# Patient Record
Sex: MALE | Race: WHITE | Employment: OTHER | URBAN - METROPOLITAN AREA
[De-identification: names, ages, dates, MRNs, and addresses within clinical notes are randomized per-mention and may not be internally consistent; named-entity substitution may affect disease eponyms.]

---

## 2017-10-30 PROBLEM — L03.115 CELLULITIS OF RIGHT LOWER EXTREMITY: Status: ACTIVE | Noted: 2017-01-01

## 2018-01-01 ENCOUNTER — HOSPITAL ENCOUNTER (INPATIENT)
Age: 78
LOS: 1 days | Discharge: HOME | DRG: 282 | End: 2018-03-27
Attending: EMERGENCY MEDICINE | Admitting: INTERNAL MEDICINE
Payer: MEDICARE

## 2018-01-01 ENCOUNTER — APPOINTMENT (OUTPATIENT)
Dept: GENERAL RADIOLOGY | Age: 78
End: 2018-01-01
Payer: MEDICARE

## 2018-01-01 ENCOUNTER — HOSPITAL ENCOUNTER (EMERGENCY)
Age: 78
End: 2018-09-06
Attending: EMERGENCY MEDICINE
Payer: MEDICARE

## 2018-01-01 ENCOUNTER — APPOINTMENT (OUTPATIENT)
Dept: GENERAL RADIOLOGY | Age: 78
DRG: 065 | End: 2018-01-01
Payer: MEDICARE

## 2018-01-01 ENCOUNTER — APPOINTMENT (OUTPATIENT)
Dept: GENERAL RADIOLOGY | Age: 78
DRG: 683 | End: 2018-01-01
Payer: MEDICARE

## 2018-01-01 ENCOUNTER — APPOINTMENT (OUTPATIENT)
Dept: GENERAL RADIOLOGY | Age: 78
DRG: 293 | End: 2018-01-01
Payer: MEDICARE

## 2018-01-01 ENCOUNTER — HOSPITAL ENCOUNTER (EMERGENCY)
Age: 78
Discharge: HOME | End: 2018-04-15
Attending: EMERGENCY MEDICINE
Payer: MEDICARE

## 2018-01-01 ENCOUNTER — APPOINTMENT (OUTPATIENT)
Dept: ULTRASOUND IMAGING | Age: 78
DRG: 065 | End: 2018-01-01
Payer: MEDICARE

## 2018-01-01 ENCOUNTER — HOSPITAL ENCOUNTER (INPATIENT)
Age: 78
LOS: 2 days | Discharge: HOME | DRG: 293 | End: 2018-07-07
Attending: EMERGENCY MEDICINE | Admitting: INTERNAL MEDICINE
Payer: MEDICARE

## 2018-01-01 ENCOUNTER — APPOINTMENT (OUTPATIENT)
Dept: GENERAL RADIOLOGY | Age: 78
DRG: 282 | End: 2018-01-01
Payer: MEDICARE

## 2018-01-01 ENCOUNTER — HOSPITAL ENCOUNTER (INPATIENT)
Age: 78
LOS: 3 days | Discharge: HOME HEALTH | DRG: 065 | End: 2018-08-13
Attending: EMERGENCY MEDICINE | Admitting: INTERNAL MEDICINE
Payer: MEDICARE

## 2018-01-01 ENCOUNTER — HOSPITAL ENCOUNTER (INPATIENT)
Age: 78
LOS: 3 days | Discharge: HOME HEALTH | DRG: 683 | End: 2018-04-11
Attending: EMERGENCY MEDICINE | Admitting: INTERNAL MEDICINE
Payer: MEDICARE

## 2018-01-01 ENCOUNTER — APPOINTMENT (OUTPATIENT)
Dept: CT IMAGING | Age: 78
DRG: 065 | End: 2018-01-01
Payer: MEDICARE

## 2018-01-01 VITALS
RESPIRATION RATE: 18 BRPM | HEIGHT: 70 IN | OXYGEN SATURATION: 95 % | WEIGHT: 253.8 LBS | HEART RATE: 71 BPM | TEMPERATURE: 97.4 F | DIASTOLIC BLOOD PRESSURE: 52 MMHG | BODY MASS INDEX: 36.33 KG/M2 | SYSTOLIC BLOOD PRESSURE: 84 MMHG

## 2018-01-01 VITALS
BODY MASS INDEX: 34.44 KG/M2 | SYSTOLIC BLOOD PRESSURE: 100 MMHG | RESPIRATION RATE: 16 BRPM | DIASTOLIC BLOOD PRESSURE: 60 MMHG | WEIGHT: 240 LBS | TEMPERATURE: 98.7 F | HEART RATE: 70 BPM | OXYGEN SATURATION: 96 %

## 2018-01-01 VITALS
TEMPERATURE: 97.8 F | BODY MASS INDEX: 33.31 KG/M2 | HEART RATE: 71 BPM | DIASTOLIC BLOOD PRESSURE: 60 MMHG | RESPIRATION RATE: 16 BRPM | SYSTOLIC BLOOD PRESSURE: 93 MMHG | OXYGEN SATURATION: 93 % | WEIGHT: 224.9 LBS | HEIGHT: 69 IN

## 2018-01-01 VITALS
SYSTOLIC BLOOD PRESSURE: 102 MMHG | OXYGEN SATURATION: 99 % | RESPIRATION RATE: 18 BRPM | BODY MASS INDEX: 36.79 KG/M2 | HEIGHT: 70 IN | TEMPERATURE: 97.6 F | HEART RATE: 69 BPM | DIASTOLIC BLOOD PRESSURE: 62 MMHG | WEIGHT: 257 LBS

## 2018-01-01 VITALS
TEMPERATURE: 97.9 F | DIASTOLIC BLOOD PRESSURE: 53 MMHG | BODY MASS INDEX: 31.55 KG/M2 | HEIGHT: 69 IN | WEIGHT: 213 LBS | SYSTOLIC BLOOD PRESSURE: 115 MMHG | RESPIRATION RATE: 20 BRPM | HEART RATE: 71 BPM | OXYGEN SATURATION: 99 %

## 2018-01-01 VITALS — HEART RATE: 102 BPM

## 2018-01-01 DIAGNOSIS — R53.1 GENERALIZED WEAKNESS: ICD-10-CM

## 2018-01-01 DIAGNOSIS — I46.9 CARDIAC ARREST (HCC): ICD-10-CM

## 2018-01-01 DIAGNOSIS — J81.0 ACUTE PULMONARY EDEMA (HCC): ICD-10-CM

## 2018-01-01 DIAGNOSIS — R09.2 RESPIRATORY ARREST (HCC): Primary | ICD-10-CM

## 2018-01-01 DIAGNOSIS — I21.4 NSTEMI (NON-ST ELEVATED MYOCARDIAL INFARCTION) (HCC): Primary | ICD-10-CM

## 2018-01-01 DIAGNOSIS — S32.2XXA CLOSED FRACTURE OF COCCYX, INITIAL ENCOUNTER (HCC): ICD-10-CM

## 2018-01-01 DIAGNOSIS — R77.8 ELEVATED TROPONIN: ICD-10-CM

## 2018-01-01 DIAGNOSIS — I50.9 ACUTE ON CHRONIC CONGESTIVE HEART FAILURE, UNSPECIFIED CONGESTIVE HEART FAILURE TYPE: Primary | ICD-10-CM

## 2018-01-01 DIAGNOSIS — I50.9 ACUTE ON CHRONIC CONGESTIVE HEART FAILURE, UNSPECIFIED CONGESTIVE HEART FAILURE TYPE: ICD-10-CM

## 2018-01-01 DIAGNOSIS — J90 PLEURAL EFFUSION: ICD-10-CM

## 2018-01-01 DIAGNOSIS — N18.9 CHRONIC KIDNEY DISEASE, UNSPECIFIED CKD STAGE: ICD-10-CM

## 2018-01-01 DIAGNOSIS — R53.83 FATIGUE, UNSPECIFIED TYPE: ICD-10-CM

## 2018-01-01 DIAGNOSIS — R07.9 CHEST PAIN IN ADULT: Primary | ICD-10-CM

## 2018-01-01 DIAGNOSIS — I50.42 CHRONIC COMBINED SYSTOLIC AND DIASTOLIC CONGESTIVE HEART FAILURE (HCC): ICD-10-CM

## 2018-01-01 DIAGNOSIS — B35.6 TINEA CRURIS: Primary | ICD-10-CM

## 2018-01-01 DIAGNOSIS — N17.9 AKI (ACUTE KIDNEY INJURY) (HCC): ICD-10-CM

## 2018-01-01 DIAGNOSIS — B37.2 CUTANEOUS CANDIDIASIS: ICD-10-CM

## 2018-01-01 DIAGNOSIS — I50.9 ACUTE ON CHRONIC CONGESTIVE HEART FAILURE, UNSPECIFIED HEART FAILURE TYPE (HCC): Primary | ICD-10-CM

## 2018-01-01 LAB
ALBUMIN SERPL-MCNC: 3.1 G/DL (ref 3.5–5.2)
ALBUMIN SERPL-MCNC: 3.4 G/DL (ref 3.5–5.2)
ALBUMIN SERPL-MCNC: 3.5 G/DL (ref 3.5–5.2)
ALBUMIN SERPL-MCNC: 3.5 G/DL (ref 3.5–5.2)
ALBUMIN SERPL-MCNC: 3.6 G/DL (ref 3.5–5.2)
ALBUMIN SERPL-MCNC: 3.6 G/DL (ref 3.5–5.2)
ALBUMIN SERPL-MCNC: 3.8 G/DL (ref 3.5–5.2)
ALBUMIN SERPL-MCNC: 3.9 G/DL (ref 3.5–5.2)
ALP BLD-CCNC: 109 U/L (ref 40–129)
ALP BLD-CCNC: 112 U/L (ref 40–129)
ALP BLD-CCNC: 55 U/L (ref 40–129)
ALP BLD-CCNC: 55 U/L (ref 40–129)
ALP BLD-CCNC: 58 U/L (ref 40–129)
ALP BLD-CCNC: 63 U/L (ref 40–129)
ALP BLD-CCNC: 64 U/L (ref 40–129)
ALP BLD-CCNC: 68 U/L (ref 40–129)
ALP BLD-CCNC: 72 U/L (ref 40–129)
ALP BLD-CCNC: 91 U/L (ref 40–129)
ALT SERPL-CCNC: 30 U/L (ref 0–40)
ALT SERPL-CCNC: 34 U/L (ref 0–40)
ALT SERPL-CCNC: 39 U/L (ref 0–40)
ALT SERPL-CCNC: 40 U/L (ref 0–40)
ALT SERPL-CCNC: 41 U/L (ref 0–40)
ALT SERPL-CCNC: 44 U/L (ref 0–40)
ALT SERPL-CCNC: 47 U/L (ref 0–40)
ALT SERPL-CCNC: 54 U/L (ref 0–40)
ALT SERPL-CCNC: 62 U/L (ref 0–40)
ALT SERPL-CCNC: 63 U/L (ref 0–40)
ANION GAP SERPL CALCULATED.3IONS-SCNC: 10 MMOL/L (ref 7–16)
ANION GAP SERPL CALCULATED.3IONS-SCNC: 10 MMOL/L (ref 7–16)
ANION GAP SERPL CALCULATED.3IONS-SCNC: 11 MMOL/L (ref 7–16)
ANION GAP SERPL CALCULATED.3IONS-SCNC: 11 MMOL/L (ref 7–16)
ANION GAP SERPL CALCULATED.3IONS-SCNC: 12 MMOL/L (ref 7–16)
ANION GAP SERPL CALCULATED.3IONS-SCNC: 12 MMOL/L (ref 7–16)
ANION GAP SERPL CALCULATED.3IONS-SCNC: 13 MMOL/L (ref 7–16)
ANION GAP SERPL CALCULATED.3IONS-SCNC: 13 MMOL/L (ref 7–16)
ANION GAP SERPL CALCULATED.3IONS-SCNC: 14 MMOL/L (ref 7–16)
ANION GAP SERPL CALCULATED.3IONS-SCNC: 15 MMOL/L (ref 7–16)
ANION GAP SERPL CALCULATED.3IONS-SCNC: 8 MMOL/L (ref 7–16)
ANION GAP SERPL CALCULATED.3IONS-SCNC: 8 MMOL/L (ref 7–16)
APTT: 34.6 SEC (ref 24.5–35.1)
APTT: 41.7 SEC (ref 24.5–35.1)
AST SERPL-CCNC: 19 U/L (ref 0–39)
AST SERPL-CCNC: 26 U/L (ref 0–39)
AST SERPL-CCNC: 27 U/L (ref 0–39)
AST SERPL-CCNC: 28 U/L (ref 0–39)
AST SERPL-CCNC: 31 U/L (ref 0–39)
AST SERPL-CCNC: 33 U/L (ref 0–39)
AST SERPL-CCNC: 34 U/L (ref 0–39)
AST SERPL-CCNC: 40 U/L (ref 0–39)
AST SERPL-CCNC: 45 U/L (ref 0–39)
AST SERPL-CCNC: 56 U/L (ref 0–39)
BACTERIA: NORMAL /HPF
BASOPHILS ABSOLUTE: 0.05 E9/L (ref 0–0.2)
BASOPHILS ABSOLUTE: 0.05 E9/L (ref 0–0.2)
BASOPHILS ABSOLUTE: 0.06 E9/L (ref 0–0.2)
BASOPHILS ABSOLUTE: 0.07 E9/L (ref 0–0.2)
BASOPHILS ABSOLUTE: 0.07 E9/L (ref 0–0.2)
BASOPHILS RELATIVE PERCENT: 0.4 % (ref 0–2)
BASOPHILS RELATIVE PERCENT: 0.5 % (ref 0–2)
BASOPHILS RELATIVE PERCENT: 0.6 % (ref 0–2)
BASOPHILS RELATIVE PERCENT: 0.7 % (ref 0–2)
BILIRUB SERPL-MCNC: 0.4 MG/DL (ref 0–1.2)
BILIRUB SERPL-MCNC: 0.5 MG/DL (ref 0–1.2)
BILIRUB SERPL-MCNC: 0.7 MG/DL (ref 0–1.2)
BILIRUB SERPL-MCNC: 0.9 MG/DL (ref 0–1.2)
BILIRUB SERPL-MCNC: 1.1 MG/DL (ref 0–1.2)
BILIRUB SERPL-MCNC: 1.2 MG/DL (ref 0–1.2)
BILIRUB SERPL-MCNC: 1.3 MG/DL (ref 0–1.2)
BILIRUBIN URINE: NEGATIVE
BLOOD, URINE: ABNORMAL
BLOOD, URINE: NEGATIVE
BLOOD, URINE: NEGATIVE
BUN BLDV-MCNC: 30 MG/DL (ref 8–23)
BUN BLDV-MCNC: 33 MG/DL (ref 8–23)
BUN BLDV-MCNC: 34 MG/DL (ref 8–23)
BUN BLDV-MCNC: 34 MG/DL (ref 8–23)
BUN BLDV-MCNC: 35 MG/DL (ref 8–23)
BUN BLDV-MCNC: 40 MG/DL (ref 8–23)
BUN BLDV-MCNC: 41 MG/DL (ref 8–23)
BUN BLDV-MCNC: 41 MG/DL (ref 8–23)
BUN BLDV-MCNC: 44 MG/DL (ref 8–23)
BUN BLDV-MCNC: 49 MG/DL (ref 8–23)
BUN BLDV-MCNC: 49 MG/DL (ref 8–23)
BUN BLDV-MCNC: 55 MG/DL (ref 8–23)
CALCIUM SERPL-MCNC: 9 MG/DL (ref 8.6–10.2)
CALCIUM SERPL-MCNC: 9 MG/DL (ref 8.6–10.2)
CALCIUM SERPL-MCNC: 9.2 MG/DL (ref 8.6–10.2)
CALCIUM SERPL-MCNC: 9.2 MG/DL (ref 8.6–10.2)
CALCIUM SERPL-MCNC: 9.3 MG/DL (ref 8.6–10.2)
CALCIUM SERPL-MCNC: 9.3 MG/DL (ref 8.6–10.2)
CALCIUM SERPL-MCNC: 9.4 MG/DL (ref 8.6–10.2)
CALCIUM SERPL-MCNC: 9.5 MG/DL (ref 8.6–10.2)
CALCIUM SERPL-MCNC: 9.6 MG/DL (ref 8.6–10.2)
CALCIUM SERPL-MCNC: 9.9 MG/DL (ref 8.6–10.2)
CASTS: NORMAL /LPF
CHLORIDE BLD-SCNC: 100 MMOL/L (ref 98–107)
CHLORIDE BLD-SCNC: 101 MMOL/L (ref 98–107)
CHLORIDE BLD-SCNC: 101 MMOL/L (ref 98–107)
CHLORIDE BLD-SCNC: 102 MMOL/L (ref 98–107)
CHLORIDE BLD-SCNC: 102 MMOL/L (ref 98–107)
CHLORIDE BLD-SCNC: 103 MMOL/L (ref 98–107)
CHLORIDE BLD-SCNC: 104 MMOL/L (ref 98–107)
CHLORIDE BLD-SCNC: 107 MMOL/L (ref 98–107)
CHLORIDE BLD-SCNC: 98 MMOL/L (ref 98–107)
CHLORIDE BLD-SCNC: 99 MMOL/L (ref 98–107)
CHOLESTEROL, TOTAL: 103 MG/DL (ref 0–199)
CHOLESTEROL, TOTAL: 79 MG/DL (ref 0–199)
CK MB: 3.5 NG/ML (ref 0–7.7)
CK MB: 3.8 NG/ML (ref 0–7.7)
CLARITY: CLEAR
CO2: 24 MMOL/L (ref 22–29)
CO2: 25 MMOL/L (ref 22–29)
CO2: 25 MMOL/L (ref 22–29)
CO2: 26 MMOL/L (ref 22–29)
CO2: 27 MMOL/L (ref 22–29)
CO2: 28 MMOL/L (ref 22–29)
CO2: 29 MMOL/L (ref 22–29)
CO2: 30 MMOL/L (ref 22–29)
CO2: 31 MMOL/L (ref 22–29)
COLOR: YELLOW
CREAT SERPL-MCNC: 1.2 MG/DL (ref 0.7–1.2)
CREAT SERPL-MCNC: 1.4 MG/DL (ref 0.7–1.2)
CREAT SERPL-MCNC: 1.5 MG/DL (ref 0.7–1.2)
CREAT SERPL-MCNC: 1.7 MG/DL (ref 0.7–1.2)
CREAT SERPL-MCNC: 1.8 MG/DL (ref 0.7–1.2)
CREAT SERPL-MCNC: 1.8 MG/DL (ref 0.7–1.2)
EKG ATRIAL RATE: 202 BPM
EKG ATRIAL RATE: 267 BPM
EKG ATRIAL RATE: 67 BPM
EKG ATRIAL RATE: 70 BPM
EKG ATRIAL RATE: 72 BPM
EKG ATRIAL RATE: 82 BPM
EKG P-R INTERVAL: 126 MS
EKG Q-T INTERVAL: 292 MS
EKG Q-T INTERVAL: 474 MS
EKG Q-T INTERVAL: 486 MS
EKG Q-T INTERVAL: 492 MS
EKG Q-T INTERVAL: 498 MS
EKG Q-T INTERVAL: 506 MS
EKG QRS DURATION: 166 MS
EKG QRS DURATION: 170 MS
EKG QRS DURATION: 172 MS
EKG QRS DURATION: 176 MS
EKG QRS DURATION: 178 MS
EKG QRS DURATION: 74 MS
EKG QTC CALCULATION (BAZETT): 440 MS
EKG QTC CALCULATION (BAZETT): 511 MS
EKG QTC CALCULATION (BAZETT): 524 MS
EKG QTC CALCULATION (BAZETT): 534 MS
EKG QTC CALCULATION (BAZETT): 541 MS
EKG QTC CALCULATION (BAZETT): 569 MS
EKG R AXIS: -140 DEGREES
EKG R AXIS: -145 DEGREES
EKG R AXIS: -152 DEGREES
EKG R AXIS: -74 DEGREES
EKG R AXIS: -77 DEGREES
EKG R AXIS: -93 DEGREES
EKG T AXIS: 102 DEGREES
EKG T AXIS: 26 DEGREES
EKG T AXIS: 87 DEGREES
EKG T AXIS: 90 DEGREES
EKG T AXIS: 94 DEGREES
EKG T AXIS: 99 DEGREES
EKG VENTRICULAR RATE: 137 BPM
EKG VENTRICULAR RATE: 70 BPM
EKG VENTRICULAR RATE: 70 BPM
EKG VENTRICULAR RATE: 71 BPM
EKG VENTRICULAR RATE: 71 BPM
EKG VENTRICULAR RATE: 76 BPM
EOSINOPHILS ABSOLUTE: 0.06 E9/L (ref 0.05–0.5)
EOSINOPHILS ABSOLUTE: 0.07 E9/L (ref 0.05–0.5)
EOSINOPHILS ABSOLUTE: 0.07 E9/L (ref 0.05–0.5)
EOSINOPHILS ABSOLUTE: 0.1 E9/L (ref 0.05–0.5)
EOSINOPHILS ABSOLUTE: 0.11 E9/L (ref 0.05–0.5)
EOSINOPHILS ABSOLUTE: 0.12 E9/L (ref 0.05–0.5)
EOSINOPHILS ABSOLUTE: 0.12 E9/L (ref 0.05–0.5)
EOSINOPHILS ABSOLUTE: 0.16 E9/L (ref 0.05–0.5)
EOSINOPHILS ABSOLUTE: 0.16 E9/L (ref 0.05–0.5)
EOSINOPHILS ABSOLUTE: 0.21 E9/L (ref 0.05–0.5)
EOSINOPHILS ABSOLUTE: 0.25 E9/L (ref 0.05–0.5)
EOSINOPHILS RELATIVE PERCENT: 0.6 % (ref 0–6)
EOSINOPHILS RELATIVE PERCENT: 0.9 % (ref 0–6)
EOSINOPHILS RELATIVE PERCENT: 1 % (ref 0–6)
EOSINOPHILS RELATIVE PERCENT: 1.1 % (ref 0–6)
EOSINOPHILS RELATIVE PERCENT: 1.1 % (ref 0–6)
EOSINOPHILS RELATIVE PERCENT: 1.2 % (ref 0–6)
EOSINOPHILS RELATIVE PERCENT: 1.4 % (ref 0–6)
EOSINOPHILS RELATIVE PERCENT: 2 % (ref 0–6)
EOSINOPHILS RELATIVE PERCENT: 2.2 % (ref 0–6)
GFR AFRICAN AMERICAN: 45
GFR AFRICAN AMERICAN: 45
GFR AFRICAN AMERICAN: 48
GFR AFRICAN AMERICAN: 55
GFR AFRICAN AMERICAN: 60
GFR AFRICAN AMERICAN: >60
GFR NON-AFRICAN AMERICAN: 37 ML/MIN/1.73
GFR NON-AFRICAN AMERICAN: 37 ML/MIN/1.73
GFR NON-AFRICAN AMERICAN: 39 ML/MIN/1.73
GFR NON-AFRICAN AMERICAN: 45 ML/MIN/1.73
GFR NON-AFRICAN AMERICAN: 49 ML/MIN/1.73
GFR NON-AFRICAN AMERICAN: 59 ML/MIN/1.73
GLUCOSE BLD-MCNC: 104 MG/DL (ref 74–109)
GLUCOSE BLD-MCNC: 116 MG/DL (ref 74–109)
GLUCOSE BLD-MCNC: 121 MG/DL (ref 74–109)
GLUCOSE BLD-MCNC: 128 MG/DL (ref 74–109)
GLUCOSE BLD-MCNC: 138 MG/DL (ref 74–109)
GLUCOSE BLD-MCNC: 147 MG/DL (ref 74–109)
GLUCOSE BLD-MCNC: 160 MG/DL (ref 74–109)
GLUCOSE BLD-MCNC: 178 MG/DL (ref 74–109)
GLUCOSE BLD-MCNC: 184 MG/DL (ref 74–109)
GLUCOSE BLD-MCNC: 199 MG/DL (ref 74–109)
GLUCOSE BLD-MCNC: 208 MG/DL (ref 74–109)
GLUCOSE BLD-MCNC: 96 MG/DL (ref 74–109)
GLUCOSE URINE: NEGATIVE MG/DL
HBA1C MFR BLD: 7.1 % (ref 4–5.6)
HBA1C MFR BLD: 7.8 % (ref 4.8–5.9)
HBA1C MFR BLD: 7.9 % (ref 4.8–5.9)
HCT VFR BLD CALC: 38.4 % (ref 37–54)
HCT VFR BLD CALC: 38.9 % (ref 37–54)
HCT VFR BLD CALC: 40 % (ref 37–54)
HCT VFR BLD CALC: 40 % (ref 37–54)
HCT VFR BLD CALC: 40.4 % (ref 37–54)
HCT VFR BLD CALC: 40.9 % (ref 37–54)
HCT VFR BLD CALC: 41 % (ref 37–54)
HCT VFR BLD CALC: 41 % (ref 37–54)
HCT VFR BLD CALC: 41.5 % (ref 37–54)
HCT VFR BLD CALC: 42.5 % (ref 37–54)
HCT VFR BLD CALC: 42.7 % (ref 37–54)
HCT VFR BLD CALC: 43 % (ref 37–54)
HDLC SERPL-MCNC: 20 MG/DL
HDLC SERPL-MCNC: 23 MG/DL
HEMOGLOBIN: 12.8 G/DL (ref 12.5–16.5)
HEMOGLOBIN: 12.9 G/DL (ref 12.5–16.5)
HEMOGLOBIN: 13 G/DL (ref 12.5–16.5)
HEMOGLOBIN: 13.1 G/DL (ref 12.5–16.5)
HEMOGLOBIN: 13.2 G/DL (ref 12.5–16.5)
HEMOGLOBIN: 13.3 G/DL (ref 12.5–16.5)
HEMOGLOBIN: 13.3 G/DL (ref 12.5–16.5)
HEMOGLOBIN: 13.5 G/DL (ref 12.5–16.5)
HEMOGLOBIN: 13.6 G/DL (ref 12.5–16.5)
HEMOGLOBIN: 13.7 G/DL (ref 12.5–16.5)
HEMOGLOBIN: 13.8 G/DL (ref 12.5–16.5)
HEMOGLOBIN: 13.8 G/DL (ref 12.5–16.5)
IMMATURE GRANULOCYTES #: 0.04 E9/L
IMMATURE GRANULOCYTES #: 0.04 E9/L
IMMATURE GRANULOCYTES #: 0.05 E9/L
IMMATURE GRANULOCYTES #: 0.06 E9/L
IMMATURE GRANULOCYTES #: 0.07 E9/L
IMMATURE GRANULOCYTES #: 0.07 E9/L
IMMATURE GRANULOCYTES %: 0.3 % (ref 0–5)
IMMATURE GRANULOCYTES %: 0.4 % (ref 0–5)
IMMATURE GRANULOCYTES %: 0.4 % (ref 0–5)
IMMATURE GRANULOCYTES %: 0.5 % (ref 0–5)
IMMATURE GRANULOCYTES %: 0.6 % (ref 0–5)
IMMATURE GRANULOCYTES %: 0.6 % (ref 0–5)
INFLUENZA A BY PCR: NOT DETECTED
INFLUENZA B BY PCR: NOT DETECTED
INR BLD: 1.6
INR BLD: 2.1
INR BLD: 2.1
INR BLD: 2.3
INR BLD: 2.5
INR BLD: 3
INR BLD: 3.2
INR BLD: 3.3
INR BLD: 3.9
INR BLD: 5
INR BLD: 5.8
KETONES, URINE: NEGATIVE MG/DL
LACTIC ACID: 1.7 MMOL/L (ref 0.5–2.2)
LDL CHOLESTEROL CALCULATED: 40 MG/DL (ref 0–99)
LDL CHOLESTEROL CALCULATED: 63 MG/DL (ref 0–99)
LEUKOCYTE ESTERASE, URINE: NEGATIVE
LV EF: 18 %
LV EF: 20 %
LVEF MODALITY: NORMAL
LVEF MODALITY: NORMAL
LYMPHOCYTES ABSOLUTE: 0.72 E9/L (ref 1.5–4)
LYMPHOCYTES ABSOLUTE: 0.75 E9/L (ref 1.5–4)
LYMPHOCYTES ABSOLUTE: 0.77 E9/L (ref 1.5–4)
LYMPHOCYTES ABSOLUTE: 0.82 E9/L (ref 1.5–4)
LYMPHOCYTES ABSOLUTE: 0.93 E9/L (ref 1.5–4)
LYMPHOCYTES ABSOLUTE: 1 E9/L (ref 1.5–4)
LYMPHOCYTES ABSOLUTE: 1 E9/L (ref 1.5–4)
LYMPHOCYTES ABSOLUTE: 1.04 E9/L (ref 1.5–4)
LYMPHOCYTES ABSOLUTE: 1.08 E9/L (ref 1.5–4)
LYMPHOCYTES ABSOLUTE: 1.08 E9/L (ref 1.5–4)
LYMPHOCYTES ABSOLUTE: 1.13 E9/L (ref 1.5–4)
LYMPHOCYTES RELATIVE PERCENT: 10.8 % (ref 20–42)
LYMPHOCYTES RELATIVE PERCENT: 6.5 % (ref 20–42)
LYMPHOCYTES RELATIVE PERCENT: 6.6 % (ref 20–42)
LYMPHOCYTES RELATIVE PERCENT: 6.8 % (ref 20–42)
LYMPHOCYTES RELATIVE PERCENT: 7.2 % (ref 20–42)
LYMPHOCYTES RELATIVE PERCENT: 8.3 % (ref 20–42)
LYMPHOCYTES RELATIVE PERCENT: 8.6 % (ref 20–42)
LYMPHOCYTES RELATIVE PERCENT: 8.7 % (ref 20–42)
LYMPHOCYTES RELATIVE PERCENT: 8.7 % (ref 20–42)
LYMPHOCYTES RELATIVE PERCENT: 9.4 % (ref 20–42)
LYMPHOCYTES RELATIVE PERCENT: 9.9 % (ref 20–42)
MAGNESIUM: 2.1 MG/DL (ref 1.6–2.6)
MAGNESIUM: 2.6 MG/DL (ref 1.6–2.6)
MAGNESIUM: 2.7 MG/DL (ref 1.6–2.6)
MAGNESIUM: 2.7 MG/DL (ref 1.6–2.6)
MCH RBC QN AUTO: 31.5 PG (ref 26–35)
MCH RBC QN AUTO: 31.7 PG (ref 26–35)
MCH RBC QN AUTO: 32.3 PG (ref 26–35)
MCH RBC QN AUTO: 32.4 PG (ref 26–35)
MCH RBC QN AUTO: 32.5 PG (ref 26–35)
MCH RBC QN AUTO: 32.8 PG (ref 26–35)
MCH RBC QN AUTO: 32.9 PG (ref 26–35)
MCH RBC QN AUTO: 33 PG (ref 26–35)
MCH RBC QN AUTO: 33.1 PG (ref 26–35)
MCHC RBC AUTO-ENTMCNC: 32 % (ref 32–34.5)
MCHC RBC AUTO-ENTMCNC: 32.1 % (ref 32–34.5)
MCHC RBC AUTO-ENTMCNC: 32.2 % (ref 32–34.5)
MCHC RBC AUTO-ENTMCNC: 32.3 % (ref 32–34.5)
MCHC RBC AUTO-ENTMCNC: 32.4 % (ref 32–34.5)
MCHC RBC AUTO-ENTMCNC: 32.5 % (ref 32–34.5)
MCHC RBC AUTO-ENTMCNC: 32.7 % (ref 32–34.5)
MCHC RBC AUTO-ENTMCNC: 32.8 % (ref 32–34.5)
MCHC RBC AUTO-ENTMCNC: 32.9 % (ref 32–34.5)
MCHC RBC AUTO-ENTMCNC: 33.2 % (ref 32–34.5)
MCHC RBC AUTO-ENTMCNC: 33.3 % (ref 32–34.5)
MCHC RBC AUTO-ENTMCNC: 33.3 % (ref 32–34.5)
MCV RBC AUTO: 100.7 FL (ref 80–99.9)
MCV RBC AUTO: 101.2 FL (ref 80–99.9)
MCV RBC AUTO: 101.3 FL (ref 80–99.9)
MCV RBC AUTO: 101.4 FL (ref 80–99.9)
MCV RBC AUTO: 101.7 FL (ref 80–99.9)
MCV RBC AUTO: 102.2 FL (ref 80–99.9)
MCV RBC AUTO: 102.4 FL (ref 80–99.9)
MCV RBC AUTO: 95 FL (ref 80–99.9)
MCV RBC AUTO: 96.1 FL (ref 80–99.9)
MCV RBC AUTO: 97.8 FL (ref 80–99.9)
MCV RBC AUTO: 98.1 FL (ref 80–99.9)
MCV RBC AUTO: 99.7 FL (ref 80–99.9)
METER GLUCOSE: 104 MG/DL (ref 70–110)
METER GLUCOSE: 106 MG/DL (ref 70–110)
METER GLUCOSE: 108 MG/DL (ref 70–110)
METER GLUCOSE: 112 MG/DL (ref 70–110)
METER GLUCOSE: 112 MG/DL (ref 70–110)
METER GLUCOSE: 114 MG/DL (ref 70–110)
METER GLUCOSE: 115 MG/DL (ref 70–110)
METER GLUCOSE: 117 MG/DL (ref 70–110)
METER GLUCOSE: 118 MG/DL (ref 70–110)
METER GLUCOSE: 123 MG/DL (ref 70–110)
METER GLUCOSE: 125 MG/DL (ref 70–110)
METER GLUCOSE: 126 MG/DL (ref 70–110)
METER GLUCOSE: 134 MG/DL (ref 70–110)
METER GLUCOSE: 138 MG/DL (ref 70–110)
METER GLUCOSE: 144 MG/DL (ref 70–110)
METER GLUCOSE: 152 MG/DL (ref 70–110)
METER GLUCOSE: 153 MG/DL (ref 70–110)
METER GLUCOSE: 160 MG/DL (ref 70–110)
METER GLUCOSE: 161 MG/DL (ref 70–110)
METER GLUCOSE: 163 MG/DL (ref 70–110)
METER GLUCOSE: 169 MG/DL (ref 70–110)
METER GLUCOSE: 172 MG/DL (ref 70–110)
METER GLUCOSE: 179 MG/DL (ref 70–110)
METER GLUCOSE: 184 MG/DL (ref 70–110)
METER GLUCOSE: 189 MG/DL (ref 70–110)
METER GLUCOSE: 201 MG/DL (ref 70–110)
METER GLUCOSE: 210 MG/DL (ref 70–110)
METER GLUCOSE: 237 MG/DL (ref 70–110)
METER GLUCOSE: 240 MG/DL (ref 70–110)
METER GLUCOSE: 288 MG/DL (ref 70–110)
METER GLUCOSE: 61 MG/DL (ref 70–110)
METER GLUCOSE: 61 MG/DL (ref 70–110)
METER GLUCOSE: 79 MG/DL (ref 70–110)
METER GLUCOSE: 90 MG/DL (ref 70–110)
METER GLUCOSE: 92 MG/DL (ref 70–110)
METER GLUCOSE: 92 MG/DL (ref 70–110)
METER GLUCOSE: 93 MG/DL (ref 70–110)
METER GLUCOSE: 95 MG/DL (ref 70–110)
MONOCYTES ABSOLUTE: 0.81 E9/L (ref 0.1–0.95)
MONOCYTES ABSOLUTE: 0.9 E9/L (ref 0.1–0.95)
MONOCYTES ABSOLUTE: 0.9 E9/L (ref 0.1–0.95)
MONOCYTES ABSOLUTE: 0.91 E9/L (ref 0.1–0.95)
MONOCYTES ABSOLUTE: 0.98 E9/L (ref 0.1–0.95)
MONOCYTES ABSOLUTE: 1.05 E9/L (ref 0.1–0.95)
MONOCYTES ABSOLUTE: 1.08 E9/L (ref 0.1–0.95)
MONOCYTES ABSOLUTE: 1.09 E9/L (ref 0.1–0.95)
MONOCYTES ABSOLUTE: 1.11 E9/L (ref 0.1–0.95)
MONOCYTES ABSOLUTE: 1.13 E9/L (ref 0.1–0.95)
MONOCYTES ABSOLUTE: 1.37 E9/L (ref 0.1–0.95)
MONOCYTES RELATIVE PERCENT: 10.8 % (ref 2–12)
MONOCYTES RELATIVE PERCENT: 11.9 % (ref 2–12)
MONOCYTES RELATIVE PERCENT: 7.8 % (ref 2–12)
MONOCYTES RELATIVE PERCENT: 7.8 % (ref 2–12)
MONOCYTES RELATIVE PERCENT: 8.1 % (ref 2–12)
MONOCYTES RELATIVE PERCENT: 8.5 % (ref 2–12)
MONOCYTES RELATIVE PERCENT: 8.7 % (ref 2–12)
MONOCYTES RELATIVE PERCENT: 8.9 % (ref 2–12)
MONOCYTES RELATIVE PERCENT: 8.9 % (ref 2–12)
MONOCYTES RELATIVE PERCENT: 9.4 % (ref 2–12)
MONOCYTES RELATIVE PERCENT: 9.4 % (ref 2–12)
NEUTROPHILS ABSOLUTE: 10.01 E9/L (ref 1.8–7.3)
NEUTROPHILS ABSOLUTE: 10.59 E9/L (ref 1.8–7.3)
NEUTROPHILS ABSOLUTE: 7.91 E9/L (ref 1.8–7.3)
NEUTROPHILS ABSOLUTE: 8.32 E9/L (ref 1.8–7.3)
NEUTROPHILS ABSOLUTE: 8.61 E9/L (ref 1.8–7.3)
NEUTROPHILS ABSOLUTE: 8.72 E9/L (ref 1.8–7.3)
NEUTROPHILS ABSOLUTE: 8.75 E9/L (ref 1.8–7.3)
NEUTROPHILS ABSOLUTE: 9 E9/L (ref 1.8–7.3)
NEUTROPHILS ABSOLUTE: 9.27 E9/L (ref 1.8–7.3)
NEUTROPHILS ABSOLUTE: 9.37 E9/L (ref 1.8–7.3)
NEUTROPHILS ABSOLUTE: 9.62 E9/L (ref 1.8–7.3)
NEUTROPHILS RELATIVE PERCENT: 75.2 % (ref 43–80)
NEUTROPHILS RELATIVE PERCENT: 76.4 % (ref 43–80)
NEUTROPHILS RELATIVE PERCENT: 79.2 % (ref 43–80)
NEUTROPHILS RELATIVE PERCENT: 80.5 % (ref 43–80)
NEUTROPHILS RELATIVE PERCENT: 80.7 % (ref 43–80)
NEUTROPHILS RELATIVE PERCENT: 80.9 % (ref 43–80)
NEUTROPHILS RELATIVE PERCENT: 81.4 % (ref 43–80)
NEUTROPHILS RELATIVE PERCENT: 81.8 % (ref 43–80)
NEUTROPHILS RELATIVE PERCENT: 82.3 % (ref 43–80)
NEUTROPHILS RELATIVE PERCENT: 82.7 % (ref 43–80)
NEUTROPHILS RELATIVE PERCENT: 83.3 % (ref 43–80)
NITRITE, URINE: NEGATIVE
PDW BLD-RTO: 14.8 FL (ref 11.5–15)
PDW BLD-RTO: 14.9 FL (ref 11.5–15)
PDW BLD-RTO: 14.9 FL (ref 11.5–15)
PDW BLD-RTO: 15 FL (ref 11.5–15)
PDW BLD-RTO: 15 FL (ref 11.5–15)
PDW BLD-RTO: 15.1 FL (ref 11.5–15)
PDW BLD-RTO: 15.2 FL (ref 11.5–15)
PDW BLD-RTO: 15.9 FL (ref 11.5–15)
PDW BLD-RTO: 16.2 FL (ref 11.5–15)
PDW BLD-RTO: 16.4 FL (ref 11.5–15)
PH UA: 6 (ref 5–9)
PH UA: 6 (ref 5–9)
PH UA: 7 (ref 5–9)
PLATELET # BLD: 163 E9/L (ref 130–450)
PLATELET # BLD: 171 E9/L (ref 130–450)
PLATELET # BLD: 180 E9/L (ref 130–450)
PLATELET # BLD: 187 E9/L (ref 130–450)
PLATELET # BLD: 192 E9/L (ref 130–450)
PLATELET # BLD: 194 E9/L (ref 130–450)
PLATELET # BLD: 195 E9/L (ref 130–450)
PLATELET # BLD: 196 E9/L (ref 130–450)
PLATELET # BLD: 201 E9/L (ref 130–450)
PLATELET # BLD: 212 E9/L (ref 130–450)
PLATELET # BLD: 236 E9/L (ref 130–450)
PLATELET # BLD: 243 E9/L (ref 130–450)
PMV BLD AUTO: 12.2 FL (ref 7–12)
PMV BLD AUTO: 12.3 FL (ref 7–12)
PMV BLD AUTO: 12.4 FL (ref 7–12)
PMV BLD AUTO: 12.4 FL (ref 7–12)
PMV BLD AUTO: 12.6 FL (ref 7–12)
PMV BLD AUTO: 12.6 FL (ref 7–12)
PMV BLD AUTO: 12.7 FL (ref 7–12)
PMV BLD AUTO: 12.8 FL (ref 7–12)
PMV BLD AUTO: 12.8 FL (ref 7–12)
PMV BLD AUTO: 12.9 FL (ref 7–12)
PMV BLD AUTO: 12.9 FL (ref 7–12)
PMV BLD AUTO: 13.2 FL (ref 7–12)
POTASSIUM REFLEX MAGNESIUM: 4.4 MMOL/L (ref 3.5–5)
POTASSIUM REFLEX MAGNESIUM: 4.5 MMOL/L (ref 3.5–5)
POTASSIUM REFLEX MAGNESIUM: 4.6 MMOL/L (ref 3.5–5)
POTASSIUM REFLEX MAGNESIUM: 4.6 MMOL/L (ref 3.5–5)
POTASSIUM SERPL-SCNC: 3.7 MMOL/L (ref 3.5–5)
POTASSIUM SERPL-SCNC: 4 MMOL/L (ref 3.5–5)
POTASSIUM SERPL-SCNC: 4 MMOL/L (ref 3.5–5)
POTASSIUM SERPL-SCNC: 4.2 MMOL/L (ref 3.5–5)
POTASSIUM SERPL-SCNC: 4.6 MMOL/L (ref 3.5–5)
POTASSIUM SERPL-SCNC: 5 MMOL/L (ref 3.5–5)
PRO-BNP: 2364 PG/ML (ref 0–450)
PRO-BNP: 3285 PG/ML (ref 0–450)
PRO-BNP: 4105 PG/ML (ref 0–450)
PRO-BNP: 4778 PG/ML (ref 0–450)
PRO-BNP: 8532 PG/ML (ref 0–450)
PROTEIN UA: NEGATIVE MG/DL
PROTHROMBIN TIME: 18.4 SEC (ref 9.3–12.4)
PROTHROMBIN TIME: 23.4 SEC (ref 9.3–12.4)
PROTHROMBIN TIME: 23.5 SEC (ref 9.3–12.4)
PROTHROMBIN TIME: 26.7 SEC (ref 9.3–12.4)
PROTHROMBIN TIME: 28.2 SEC (ref 9.3–12.4)
PROTHROMBIN TIME: 33.6 SEC (ref 9.3–12.4)
PROTHROMBIN TIME: 35.8 SEC (ref 9.3–12.4)
PROTHROMBIN TIME: 36.2 SEC (ref 9.3–12.4)
PROTHROMBIN TIME: 36.6 SEC (ref 9.3–12.4)
PROTHROMBIN TIME: 37.1 SEC (ref 9.3–12.4)
PROTHROMBIN TIME: 43.1 SEC (ref 9.3–12.4)
PROTHROMBIN TIME: 58.2 SEC (ref 9.3–12.4)
PROTHROMBIN TIME: 67.5 SEC (ref 9.3–12.4)
RBC # BLD: 3.9 E12/L (ref 3.8–5.8)
RBC # BLD: 3.95 E12/L (ref 3.8–5.8)
RBC # BLD: 4.01 E12/L (ref 3.8–5.8)
RBC # BLD: 4.03 E12/L (ref 3.8–5.8)
RBC # BLD: 4.04 E12/L (ref 3.8–5.8)
RBC # BLD: 4.04 E12/L (ref 3.8–5.8)
RBC # BLD: 4.09 E12/L (ref 3.8–5.8)
RBC # BLD: 4.16 E12/L (ref 3.8–5.8)
RBC # BLD: 4.18 E12/L (ref 3.8–5.8)
RBC # BLD: 4.2 E12/L (ref 3.8–5.8)
RBC # BLD: 4.21 E12/L (ref 3.8–5.8)
RBC # BLD: 4.32 E12/L (ref 3.8–5.8)
RBC UA: NORMAL /HPF (ref 0–2)
SODIUM BLD-SCNC: 135 MMOL/L (ref 132–146)
SODIUM BLD-SCNC: 139 MMOL/L (ref 132–146)
SODIUM BLD-SCNC: 141 MMOL/L (ref 132–146)
SODIUM BLD-SCNC: 142 MMOL/L (ref 132–146)
SODIUM BLD-SCNC: 143 MMOL/L (ref 132–146)
SPECIFIC GRAVITY UA: 1.01 (ref 1–1.03)
TOTAL CK: 38 U/L (ref 20–200)
TOTAL CK: 54 U/L (ref 20–200)
TOTAL PROTEIN: 5.8 G/DL (ref 6.4–8.3)
TOTAL PROTEIN: 5.9 G/DL (ref 6.4–8.3)
TOTAL PROTEIN: 6.3 G/DL (ref 6.4–8.3)
TOTAL PROTEIN: 6.3 G/DL (ref 6.4–8.3)
TOTAL PROTEIN: 6.4 G/DL (ref 6.4–8.3)
TOTAL PROTEIN: 6.5 G/DL (ref 6.4–8.3)
TRIGL SERPL-MCNC: 87 MG/DL (ref 0–149)
TRIGL SERPL-MCNC: 94 MG/DL (ref 0–149)
TROPONIN: 0.01 NG/ML (ref 0–0.03)
TROPONIN: 0.02 NG/ML (ref 0–0.03)
TROPONIN: 0.03 NG/ML (ref 0–0.03)
TROPONIN: 0.03 NG/ML (ref 0–0.03)
TROPONIN: 0.08 NG/ML (ref 0–0.03)
TROPONIN: 0.1 NG/ML (ref 0–0.03)
TROPONIN: 0.1 NG/ML (ref 0–0.03)
TROPONIN: 0.15 NG/ML (ref 0–0.03)
TROPONIN: 0.15 NG/ML (ref 0–0.03)
TROPONIN: 0.16 NG/ML (ref 0–0.03)
UROBILINOGEN, URINE: 0.2 E.U./DL
VLDLC SERPL CALC-MCNC: 17 MG/DL
VLDLC SERPL CALC-MCNC: 19 MG/DL
WBC # BLD: 10.5 E9/L (ref 4.5–11.5)
WBC # BLD: 10.6 E9/L (ref 4.5–11.5)
WBC # BLD: 11 E9/L (ref 4.5–11.5)
WBC # BLD: 11.5 E9/L (ref 4.5–11.5)
WBC # BLD: 11.5 E9/L (ref 4.5–11.5)
WBC # BLD: 11.6 E9/L (ref 4.5–11.5)
WBC # BLD: 11.8 E9/L (ref 4.5–11.5)
WBC # BLD: 12.1 E9/L (ref 4.5–11.5)
WBC # BLD: 13 E9/L (ref 4.5–11.5)
WBC # BLD: 14.8 E9/L (ref 4.5–11.5)
WBC UA: NORMAL /HPF (ref 0–5)
WOUND/ABSCESS: NORMAL

## 2018-01-01 PROCEDURE — 2580000003 HC RX 258: Performed by: INTERNAL MEDICINE

## 2018-01-01 PROCEDURE — 87070 CULTURE OTHR SPECIMN AEROBIC: CPT

## 2018-01-01 PROCEDURE — 93306 TTE W/DOPPLER COMPLETE: CPT

## 2018-01-01 PROCEDURE — 85610 PROTHROMBIN TIME: CPT

## 2018-01-01 PROCEDURE — 36415 COLL VENOUS BLD VENIPUNCTURE: CPT

## 2018-01-01 PROCEDURE — 83735 ASSAY OF MAGNESIUM: CPT

## 2018-01-01 PROCEDURE — 2700000000 HC OXYGEN THERAPY PER DAY

## 2018-01-01 PROCEDURE — 6370000000 HC RX 637 (ALT 250 FOR IP): Performed by: INTERNAL MEDICINE

## 2018-01-01 PROCEDURE — 84484 ASSAY OF TROPONIN QUANT: CPT

## 2018-01-01 PROCEDURE — 6370000000 HC RX 637 (ALT 250 FOR IP): Performed by: PODIATRIST

## 2018-01-01 PROCEDURE — 82962 GLUCOSE BLOOD TEST: CPT

## 2018-01-01 PROCEDURE — 83036 HEMOGLOBIN GLYCOSYLATED A1C: CPT

## 2018-01-01 PROCEDURE — 99285 EMERGENCY DEPT VISIT HI MDM: CPT

## 2018-01-01 PROCEDURE — 83880 ASSAY OF NATRIURETIC PEPTIDE: CPT

## 2018-01-01 PROCEDURE — 80053 COMPREHEN METABOLIC PANEL: CPT

## 2018-01-01 PROCEDURE — 99283 EMERGENCY DEPT VISIT LOW MDM: CPT

## 2018-01-01 PROCEDURE — 97165 OT EVAL LOW COMPLEX 30 MIN: CPT

## 2018-01-01 PROCEDURE — 81003 URINALYSIS AUTO W/O SCOPE: CPT

## 2018-01-01 PROCEDURE — 93005 ELECTROCARDIOGRAM TRACING: CPT

## 2018-01-01 PROCEDURE — 6360000002 HC RX W HCPCS: Performed by: EMERGENCY MEDICINE

## 2018-01-01 PROCEDURE — 6360000002 HC RX W HCPCS: Performed by: INTERNAL MEDICINE

## 2018-01-01 PROCEDURE — 31500 INSERT EMERGENCY AIRWAY: CPT

## 2018-01-01 PROCEDURE — 2580000003 HC RX 258: Performed by: STUDENT IN AN ORGANIZED HEALTH CARE EDUCATION/TRAINING PROGRAM

## 2018-01-01 PROCEDURE — 87502 INFLUENZA DNA AMP PROBE: CPT

## 2018-01-01 PROCEDURE — 85025 COMPLETE CBC W/AUTO DIFF WBC: CPT

## 2018-01-01 PROCEDURE — 2060000000 HC ICU INTERMEDIATE R&B

## 2018-01-01 PROCEDURE — G8987 SELF CARE CURRENT STATUS: HCPCS

## 2018-01-01 PROCEDURE — 85027 COMPLETE CBC AUTOMATED: CPT

## 2018-01-01 PROCEDURE — 72220 X-RAY EXAM SACRUM TAILBONE: CPT

## 2018-01-01 PROCEDURE — 71045 X-RAY EXAM CHEST 1 VIEW: CPT

## 2018-01-01 PROCEDURE — 6360000002 HC RX W HCPCS: Performed by: STUDENT IN AN ORGANIZED HEALTH CARE EDUCATION/TRAINING PROGRAM

## 2018-01-01 PROCEDURE — 97161 PT EVAL LOW COMPLEX 20 MIN: CPT

## 2018-01-01 PROCEDURE — 93005 ELECTROCARDIOGRAM TRACING: CPT | Performed by: EMERGENCY MEDICINE

## 2018-01-01 PROCEDURE — 94002 VENT MGMT INPAT INIT DAY: CPT

## 2018-01-01 PROCEDURE — 36556 INSERT NON-TUNNEL CV CATH: CPT

## 2018-01-01 PROCEDURE — 82550 ASSAY OF CK (CPK): CPT

## 2018-01-01 PROCEDURE — 81001 URINALYSIS AUTO W/SCOPE: CPT

## 2018-01-01 PROCEDURE — 6370000000 HC RX 637 (ALT 250 FOR IP): Performed by: PHYSICIAN ASSISTANT

## 2018-01-01 PROCEDURE — 80061 LIPID PANEL: CPT

## 2018-01-01 PROCEDURE — 99221 1ST HOSP IP/OBS SF/LOW 40: CPT | Performed by: PSYCHIATRY & NEUROLOGY

## 2018-01-01 PROCEDURE — 99232 SBSQ HOSP IP/OBS MODERATE 35: CPT | Performed by: NURSE PRACTITIONER

## 2018-01-01 PROCEDURE — G8979 MOBILITY GOAL STATUS: HCPCS

## 2018-01-01 PROCEDURE — G8978 MOBILITY CURRENT STATUS: HCPCS

## 2018-01-01 PROCEDURE — 97535 SELF CARE MNGMENT TRAINING: CPT

## 2018-01-01 PROCEDURE — 82553 CREATINE MB FRACTION: CPT

## 2018-01-01 PROCEDURE — 80048 BASIC METABOLIC PNL TOTAL CA: CPT

## 2018-01-01 PROCEDURE — 92950 HEART/LUNG RESUSCITATION CPR: CPT

## 2018-01-01 PROCEDURE — 6370000000 HC RX 637 (ALT 250 FOR IP)

## 2018-01-01 PROCEDURE — 93880 EXTRACRANIAL BILAT STUDY: CPT

## 2018-01-01 PROCEDURE — 85730 THROMBOPLASTIN TIME PARTIAL: CPT

## 2018-01-01 PROCEDURE — 99222 1ST HOSP IP/OBS MODERATE 55: CPT | Performed by: NURSE PRACTITIONER

## 2018-01-01 PROCEDURE — G8988 SELF CARE GOAL STATUS: HCPCS

## 2018-01-01 PROCEDURE — 70450 CT HEAD/BRAIN W/O DYE: CPT

## 2018-01-01 PROCEDURE — 51702 INSERT TEMP BLADDER CATH: CPT

## 2018-01-01 PROCEDURE — 3430000000 HC RX DIAGNOSTIC RADIOPHARMACEUTICAL: Performed by: RADIOLOGY

## 2018-01-01 PROCEDURE — 99232 SBSQ HOSP IP/OBS MODERATE 35: CPT | Performed by: CLINICAL NURSE SPECIALIST

## 2018-01-01 PROCEDURE — 97530 THERAPEUTIC ACTIVITIES: CPT

## 2018-01-01 PROCEDURE — 2140000000 HC CCU INTERMEDIATE R&B

## 2018-01-01 PROCEDURE — A9500 TC99M SESTAMIBI: HCPCS | Performed by: RADIOLOGY

## 2018-01-01 PROCEDURE — 83605 ASSAY OF LACTIC ACID: CPT

## 2018-01-01 PROCEDURE — 6370000000 HC RX 637 (ALT 250 FOR IP): Performed by: EMERGENCY MEDICINE

## 2018-01-01 PROCEDURE — 93017 CV STRESS TEST TRACING ONLY: CPT

## 2018-01-01 PROCEDURE — 78452 HT MUSCLE IMAGE SPECT MULT: CPT

## 2018-01-01 PROCEDURE — 2580000003 HC RX 258: Performed by: EMERGENCY MEDICINE

## 2018-01-01 PROCEDURE — 93005 ELECTROCARDIOGRAM TRACING: CPT | Performed by: NURSE PRACTITIONER

## 2018-01-01 PROCEDURE — 1200000000 HC SEMI PRIVATE

## 2018-01-01 RX ORDER — TORSEMIDE 20 MG/1
40 TABLET ORAL DAILY
Qty: 30 TABLET | Refills: 3 | Status: SHIPPED | OUTPATIENT
Start: 2018-01-01 | End: 2018-01-01 | Stop reason: HOSPADM

## 2018-01-01 RX ORDER — SPIRONOLACTONE 25 MG/1
12.5 TABLET ORAL DAILY
Status: DISCONTINUED | OUTPATIENT
Start: 2018-01-01 | End: 2018-01-01 | Stop reason: HOSPADM

## 2018-01-01 RX ORDER — DEXTROSE MONOHYDRATE 25 G/50ML
12.5 INJECTION, SOLUTION INTRAVENOUS PRN
Status: DISCONTINUED | OUTPATIENT
Start: 2018-01-01 | End: 2018-01-01 | Stop reason: HOSPADM

## 2018-01-01 RX ORDER — CARVEDILOL 6.25 MG/1
6.25 TABLET ORAL 2 TIMES DAILY WITH MEALS
Qty: 60 TABLET | Refills: 0 | Status: SHIPPED | OUTPATIENT
Start: 2018-01-01 | End: 2018-01-01

## 2018-01-01 RX ORDER — ISOSORBIDE MONONITRATE 30 MG/1
30 TABLET, EXTENDED RELEASE ORAL DAILY
Status: DISCONTINUED | OUTPATIENT
Start: 2018-01-01 | End: 2018-01-01 | Stop reason: HOSPADM

## 2018-01-01 RX ORDER — VALSARTAN 80 MG/1
20 TABLET ORAL DAILY
Status: DISCONTINUED | OUTPATIENT
Start: 2018-01-01 | End: 2018-01-01 | Stop reason: HOSPADM

## 2018-01-01 RX ORDER — TORSEMIDE 20 MG/1
40 TABLET ORAL 2 TIMES DAILY
Status: ON HOLD | COMMUNITY
End: 2018-01-01

## 2018-01-01 RX ORDER — CARVEDILOL 6.25 MG/1
6.25 TABLET ORAL 2 TIMES DAILY WITH MEALS
Status: DISCONTINUED | OUTPATIENT
Start: 2018-01-01 | End: 2018-01-01 | Stop reason: HOSPADM

## 2018-01-01 RX ORDER — CLOPIDOGREL BISULFATE 75 MG/1
75 TABLET ORAL DAILY
Status: DISCONTINUED | OUTPATIENT
Start: 2018-01-01 | End: 2018-01-01 | Stop reason: HOSPADM

## 2018-01-01 RX ORDER — WARFARIN SODIUM 5 MG/1
5 TABLET ORAL
Status: COMPLETED | OUTPATIENT
Start: 2018-01-01 | End: 2018-01-01

## 2018-01-01 RX ORDER — ACETAMINOPHEN 325 MG/1
650 TABLET ORAL EVERY 4 HOURS PRN
Status: DISCONTINUED | OUTPATIENT
Start: 2018-01-01 | End: 2018-01-01 | Stop reason: HOSPADM

## 2018-01-01 RX ORDER — SODIUM CHLORIDE 0.9 % (FLUSH) 0.9 %
10 SYRINGE (ML) INJECTION PRN
Status: DISCONTINUED | OUTPATIENT
Start: 2018-01-01 | End: 2018-01-01 | Stop reason: HOSPADM

## 2018-01-01 RX ORDER — ASPIRIN 81 MG/1
81 TABLET, CHEWABLE ORAL NIGHTLY
Status: DISCONTINUED | OUTPATIENT
Start: 2018-01-01 | End: 2018-01-01 | Stop reason: HOSPADM

## 2018-01-01 RX ORDER — FUROSEMIDE 10 MG/ML
40 INJECTION INTRAMUSCULAR; INTRAVENOUS DAILY
Status: DISCONTINUED | OUTPATIENT
Start: 2018-01-01 | End: 2018-01-01 | Stop reason: HOSPADM

## 2018-01-01 RX ORDER — WARFARIN SODIUM 6 MG/1
6 TABLET ORAL
Status: DISCONTINUED | OUTPATIENT
Start: 2018-01-01 | End: 2018-01-01 | Stop reason: HOSPADM

## 2018-01-01 RX ORDER — AMIODARONE HYDROCHLORIDE 100 MG/1
100 TABLET ORAL DAILY
COMMUNITY

## 2018-01-01 RX ORDER — CLOTRIMAZOLE AND BETAMETHASONE DIPROPIONATE 10; .64 MG/G; MG/G
CREAM TOPICAL
Qty: 1 TUBE | Refills: 0 | Status: SHIPPED | OUTPATIENT
Start: 2018-01-01

## 2018-01-01 RX ORDER — METFORMIN HYDROCHLORIDE 500 MG/1
500 TABLET, EXTENDED RELEASE ORAL 2 TIMES DAILY
Status: DISCONTINUED | OUTPATIENT
Start: 2018-01-01 | End: 2018-01-01

## 2018-01-01 RX ORDER — ASPIRIN 81 MG/1
TABLET, CHEWABLE ORAL
Status: DISPENSED
Start: 2018-01-01 | End: 2018-01-01

## 2018-01-01 RX ORDER — ZOLPIDEM TARTRATE 5 MG/1
5 TABLET ORAL NIGHTLY PRN
Status: DISCONTINUED | OUTPATIENT
Start: 2018-01-01 | End: 2018-01-01 | Stop reason: HOSPADM

## 2018-01-01 RX ORDER — NICOTINE POLACRILEX 4 MG
15 LOZENGE BUCCAL PRN
Status: DISCONTINUED | OUTPATIENT
Start: 2018-01-01 | End: 2018-01-01 | Stop reason: HOSPADM

## 2018-01-01 RX ORDER — ISOSORBIDE MONONITRATE 120 MG/1
120 TABLET, EXTENDED RELEASE ORAL DAILY
Qty: 30 TABLET | Refills: 3 | Status: ON HOLD | OUTPATIENT
Start: 2018-01-01 | End: 2018-01-01 | Stop reason: HOSPADM

## 2018-01-01 RX ORDER — CARVEDILOL 3.12 MG/1
25 TABLET ORAL 2 TIMES DAILY WITH MEALS
Qty: 60 TABLET | Refills: 0 | Status: ON HOLD
Start: 2018-01-01 | End: 2018-01-01 | Stop reason: HOSPADM

## 2018-01-01 RX ORDER — ATORVASTATIN CALCIUM 40 MG/1
80 TABLET, FILM COATED ORAL NIGHTLY
Status: DISCONTINUED | OUTPATIENT
Start: 2018-01-01 | End: 2018-01-01 | Stop reason: HOSPADM

## 2018-01-01 RX ORDER — MULTIVITAMIN WITH IRON
250 TABLET ORAL 2 TIMES DAILY
Qty: 60 TABLET | Refills: 0 | Status: SHIPPED | OUTPATIENT
Start: 2018-01-01

## 2018-01-01 RX ORDER — ISOSORBIDE MONONITRATE 30 MG/1
30 TABLET, EXTENDED RELEASE ORAL DAILY
Qty: 30 TABLET | Refills: 3 | Status: SHIPPED | OUTPATIENT
Start: 2018-01-01

## 2018-01-01 RX ORDER — ONDANSETRON 2 MG/ML
4 INJECTION INTRAMUSCULAR; INTRAVENOUS EVERY 6 HOURS PRN
Status: DISCONTINUED | OUTPATIENT
Start: 2018-01-01 | End: 2018-01-01 | Stop reason: HOSPADM

## 2018-01-01 RX ORDER — FUROSEMIDE 10 MG/ML
20 INJECTION INTRAMUSCULAR; INTRAVENOUS ONCE
Status: COMPLETED | OUTPATIENT
Start: 2018-01-01 | End: 2018-01-01

## 2018-01-01 RX ORDER — NITROGLYCERIN 0.4 MG/1
0.4 TABLET SUBLINGUAL EVERY 5 MIN PRN
Status: DISCONTINUED | OUTPATIENT
Start: 2018-01-01 | End: 2018-01-01 | Stop reason: HOSPADM

## 2018-01-01 RX ORDER — INSULIN GLARGINE 100 [IU]/ML
0.25 INJECTION, SOLUTION SUBCUTANEOUS NIGHTLY
Status: DISCONTINUED | OUTPATIENT
Start: 2018-01-01 | End: 2018-01-01 | Stop reason: HOSPADM

## 2018-01-01 RX ORDER — TORSEMIDE 20 MG/1
40 TABLET ORAL 2 TIMES DAILY
Status: DISCONTINUED | OUTPATIENT
Start: 2018-01-01 | End: 2018-01-01 | Stop reason: HOSPADM

## 2018-01-01 RX ORDER — CALCIUM CARBONATE 500(1250)
1000 TABLET ORAL DAILY
Status: DISCONTINUED | OUTPATIENT
Start: 2018-01-01 | End: 2018-01-01 | Stop reason: HOSPADM

## 2018-01-01 RX ORDER — MULTIVITAMIN WITH IRON
250 TABLET ORAL 2 TIMES DAILY
Status: DISCONTINUED | OUTPATIENT
Start: 2018-01-01 | End: 2018-01-01 | Stop reason: CLARIF

## 2018-01-01 RX ORDER — FLUCONAZOLE 100 MG/1
200 TABLET ORAL ONCE
Status: COMPLETED | OUTPATIENT
Start: 2018-01-01 | End: 2018-01-01

## 2018-01-01 RX ORDER — SODIUM CHLORIDE 9 MG/ML
INJECTION, SOLUTION INTRAVENOUS CONTINUOUS
Status: ACTIVE | OUTPATIENT
Start: 2018-01-01 | End: 2018-01-01

## 2018-01-01 RX ORDER — LOSARTAN POTASSIUM 25 MG/1
25 TABLET ORAL DAILY
COMMUNITY

## 2018-01-01 RX ORDER — VALSARTAN 80 MG/1
80 TABLET ORAL DAILY
Status: DISCONTINUED | OUTPATIENT
Start: 2018-01-01 | End: 2018-01-01 | Stop reason: HOSPADM

## 2018-01-01 RX ORDER — CALCIUM CARBONATE 500(1250)
2 TABLET ORAL DAILY
Status: DISCONTINUED | OUTPATIENT
Start: 2018-01-01 | End: 2018-01-01 | Stop reason: HOSPADM

## 2018-01-01 RX ORDER — FUROSEMIDE 10 MG/ML
20 INJECTION INTRAMUSCULAR; INTRAVENOUS ONCE
Status: DISCONTINUED | OUTPATIENT
Start: 2018-01-01 | End: 2018-01-01

## 2018-01-01 RX ORDER — FUROSEMIDE 40 MG/1
40 TABLET ORAL DAILY
Qty: 30 TABLET | Refills: 0 | Status: SHIPPED | OUTPATIENT
Start: 2018-01-01 | End: 2018-01-01

## 2018-01-01 RX ORDER — TORSEMIDE 20 MG/1
40 TABLET ORAL
Status: DISCONTINUED | OUTPATIENT
Start: 2018-01-01 | End: 2018-01-01 | Stop reason: HOSPADM

## 2018-01-01 RX ORDER — ACETAMINOPHEN 325 MG/1
650 TABLET ORAL EVERY 6 HOURS PRN
Status: DISCONTINUED | OUTPATIENT
Start: 2018-01-01 | End: 2018-01-01 | Stop reason: HOSPADM

## 2018-01-01 RX ORDER — INSULIN GLARGINE 100 [IU]/ML
10 INJECTION, SOLUTION SUBCUTANEOUS NIGHTLY
Status: DISCONTINUED | OUTPATIENT
Start: 2018-01-01 | End: 2018-01-01 | Stop reason: HOSPADM

## 2018-01-01 RX ORDER — ASPIRIN 325 MG
325 TABLET ORAL ONCE
Status: COMPLETED | OUTPATIENT
Start: 2018-01-01 | End: 2018-01-01

## 2018-01-01 RX ORDER — WARFARIN SODIUM 5 MG/1
5 TABLET ORAL DAILY
COMMUNITY

## 2018-01-01 RX ORDER — PETROLATUM 42 G/100G
OINTMENT TOPICAL 2 TIMES DAILY PRN
Status: DISCONTINUED | OUTPATIENT
Start: 2018-01-01 | End: 2018-01-01 | Stop reason: HOSPADM

## 2018-01-01 RX ORDER — TORSEMIDE 20 MG/1
40 TABLET ORAL 2 TIMES DAILY
Qty: 120 TABLET | Refills: 0
Start: 2018-01-01

## 2018-01-01 RX ORDER — DEXTROSE MONOHYDRATE 50 MG/ML
100 INJECTION, SOLUTION INTRAVENOUS PRN
Status: DISCONTINUED | OUTPATIENT
Start: 2018-01-01 | End: 2018-01-01 | Stop reason: HOSPADM

## 2018-01-01 RX ORDER — FUROSEMIDE 40 MG/1
40 TABLET ORAL DAILY
Status: DISCONTINUED | OUTPATIENT
Start: 2018-01-01 | End: 2018-01-01 | Stop reason: HOSPADM

## 2018-01-01 RX ORDER — LORAZEPAM 0.5 MG/1
0.5 TABLET ORAL NIGHTLY PRN
Status: DISCONTINUED | OUTPATIENT
Start: 2018-01-01 | End: 2018-01-01 | Stop reason: HOSPADM

## 2018-01-01 RX ORDER — ASPIRIN 81 MG/1
81 TABLET, CHEWABLE ORAL DAILY
Status: DISCONTINUED | OUTPATIENT
Start: 2018-01-01 | End: 2018-01-01 | Stop reason: HOSPADM

## 2018-01-01 RX ORDER — PANTOPRAZOLE SODIUM 40 MG/1
40 TABLET, DELAYED RELEASE ORAL
Qty: 30 TABLET | Refills: 3 | Status: SHIPPED | OUTPATIENT
Start: 2018-01-01

## 2018-01-01 RX ORDER — PANTOPRAZOLE SODIUM 40 MG/1
40 TABLET, DELAYED RELEASE ORAL
Status: DISCONTINUED | OUTPATIENT
Start: 2018-01-01 | End: 2018-01-01 | Stop reason: HOSPADM

## 2018-01-01 RX ORDER — PETROLATUM 42 G/100G
OINTMENT TOPICAL 2 TIMES DAILY
Status: DISCONTINUED | OUTPATIENT
Start: 2018-01-01 | End: 2018-01-01 | Stop reason: HOSPADM

## 2018-01-01 RX ORDER — PETROLATUM 42 G/100G
OINTMENT TOPICAL PRN
Status: DISCONTINUED | OUTPATIENT
Start: 2018-01-01 | End: 2018-01-01

## 2018-01-01 RX ORDER — AMIODARONE HYDROCHLORIDE 200 MG/1
100 TABLET ORAL DAILY
Status: DISCONTINUED | OUTPATIENT
Start: 2018-01-01 | End: 2018-01-01 | Stop reason: HOSPADM

## 2018-01-01 RX ORDER — MULTIVITAMIN/IRON/FOLIC ACID 18MG-0.4MG
250 TABLET ORAL 2 TIMES DAILY
Status: ON HOLD | COMMUNITY
End: 2018-01-01 | Stop reason: SDUPTHER

## 2018-01-01 RX ORDER — 0.9 % SODIUM CHLORIDE 0.9 %
1000 INTRAVENOUS SOLUTION INTRAVENOUS ONCE
Status: DISCONTINUED | OUTPATIENT
Start: 2018-01-01 | End: 2018-01-01 | Stop reason: HOSPADM

## 2018-01-01 RX ORDER — 0.9 % SODIUM CHLORIDE 0.9 %
500 INTRAVENOUS SOLUTION INTRAVENOUS ONCE
Status: COMPLETED | OUTPATIENT
Start: 2018-01-01 | End: 2018-01-01

## 2018-01-01 RX ORDER — CLINDAMYCIN HYDROCHLORIDE 150 MG/1
300 CAPSULE ORAL 4 TIMES DAILY
Status: DISCONTINUED | OUTPATIENT
Start: 2018-01-01 | End: 2018-01-01 | Stop reason: HOSPADM

## 2018-01-01 RX ORDER — ONDANSETRON 2 MG/ML
4 INJECTION INTRAMUSCULAR; INTRAVENOUS EVERY 6 HOURS PRN
Status: DISCONTINUED | OUTPATIENT
Start: 2018-01-01 | End: 2018-01-01

## 2018-01-01 RX ORDER — GLIPIZIDE 5 MG/1
5 TABLET, FILM COATED, EXTENDED RELEASE ORAL DAILY
COMMUNITY

## 2018-01-01 RX ORDER — ISOSORBIDE MONONITRATE 60 MG/1
120 TABLET, EXTENDED RELEASE ORAL DAILY
Status: DISCONTINUED | OUTPATIENT
Start: 2018-01-01 | End: 2018-01-01

## 2018-01-01 RX ORDER — ISOSORBIDE MONONITRATE 30 MG/1
30 TABLET, EXTENDED RELEASE ORAL DAILY
Status: DISCONTINUED | OUTPATIENT
Start: 2018-01-01 | End: 2018-01-01

## 2018-01-01 RX ORDER — ASPIRIN 81 MG/1
81 TABLET, CHEWABLE ORAL ONCE
Status: COMPLETED | OUTPATIENT
Start: 2018-01-01 | End: 2018-01-01

## 2018-01-01 RX ORDER — TORSEMIDE 20 MG/1
40 TABLET ORAL DAILY
Qty: 30 TABLET | Refills: 3 | Status: SHIPPED | OUTPATIENT
Start: 2018-01-01 | End: 2018-01-01

## 2018-01-01 RX ORDER — 0.9 % SODIUM CHLORIDE 0.9 %
250 INTRAVENOUS SOLUTION INTRAVENOUS ONCE
Status: COMPLETED | OUTPATIENT
Start: 2018-01-01 | End: 2018-01-01

## 2018-01-01 RX ORDER — GLIMEPIRIDE 4 MG/1
4 TABLET ORAL
Status: DISCONTINUED | OUTPATIENT
Start: 2018-01-01 | End: 2018-01-01

## 2018-01-01 RX ORDER — LOSARTAN POTASSIUM 25 MG/1
25 TABLET ORAL DAILY
Status: DISCONTINUED | OUTPATIENT
Start: 2018-01-01 | End: 2018-01-01 | Stop reason: HOSPADM

## 2018-01-01 RX ORDER — METFORMIN HYDROCHLORIDE 500 MG/1
500 TABLET, EXTENDED RELEASE ORAL 2 TIMES DAILY
COMMUNITY

## 2018-01-01 RX ORDER — PHYTONADIONE 5 MG/1
10 TABLET ORAL ONCE
Status: COMPLETED | OUTPATIENT
Start: 2018-01-01 | End: 2018-01-01

## 2018-01-01 RX ORDER — SPIRONOLACTONE 25 MG/1
12.5 TABLET ORAL DAILY
COMMUNITY

## 2018-01-01 RX ORDER — ISOSORBIDE MONONITRATE 30 MG/1
30 TABLET, EXTENDED RELEASE ORAL DAILY
Qty: 30 TABLET | Refills: 0 | Status: SHIPPED | OUTPATIENT
Start: 2018-01-01 | End: 2018-01-01

## 2018-01-01 RX ORDER — SODIUM CHLORIDE 0.9 % (FLUSH) 0.9 %
10 SYRINGE (ML) INJECTION EVERY 12 HOURS SCHEDULED
Status: DISCONTINUED | OUTPATIENT
Start: 2018-01-01 | End: 2018-01-01 | Stop reason: HOSPADM

## 2018-01-01 RX ORDER — ASPIRIN 81 MG/1
TABLET, CHEWABLE ORAL
Status: COMPLETED
Start: 2018-01-01 | End: 2018-01-01

## 2018-01-01 RX ORDER — CARVEDILOL 6.25 MG/1
6.25 TABLET ORAL 2 TIMES DAILY WITH MEALS
Qty: 60 TABLET | Refills: 3 | Status: SHIPPED | OUTPATIENT
Start: 2018-01-01

## 2018-01-01 RX ORDER — SPIRONOLACTONE 25 MG/1
25 TABLET ORAL DAILY
Status: DISCONTINUED | OUTPATIENT
Start: 2018-01-01 | End: 2018-01-01 | Stop reason: HOSPADM

## 2018-01-01 RX ORDER — FENTANYL CITRATE 50 UG/ML
25 INJECTION, SOLUTION INTRAMUSCULAR; INTRAVENOUS ONCE
Status: COMPLETED | OUTPATIENT
Start: 2018-01-01 | End: 2018-01-01

## 2018-01-01 RX ORDER — PHENOL 1.4 %
2 AEROSOL, SPRAY (ML) MUCOUS MEMBRANE DAILY
COMMUNITY

## 2018-01-01 RX ORDER — ISOSORBIDE MONONITRATE 30 MG/1
120 TABLET, EXTENDED RELEASE ORAL DAILY
Status: DISCONTINUED | OUTPATIENT
Start: 2018-01-01 | End: 2018-01-01 | Stop reason: HOSPADM

## 2018-01-01 RX ORDER — FUROSEMIDE 10 MG/ML
40 INJECTION INTRAMUSCULAR; INTRAVENOUS 2 TIMES DAILY
Status: COMPLETED | OUTPATIENT
Start: 2018-01-01 | End: 2018-01-01

## 2018-01-01 RX ORDER — CARVEDILOL 3.12 MG/1
3.12 TABLET ORAL 2 TIMES DAILY
Status: ON HOLD | COMMUNITY
End: 2018-01-01 | Stop reason: HOSPADM

## 2018-01-01 RX ORDER — CLOPIDOGREL BISULFATE 75 MG/1
75 TABLET ORAL DAILY
Status: DISCONTINUED | OUTPATIENT
Start: 2018-01-01 | End: 2018-01-01

## 2018-01-01 RX ORDER — ASCORBIC ACID 1000 MG
1 TABLET ORAL DAILY
COMMUNITY

## 2018-01-01 RX ORDER — VALSARTAN 40 MG/1
20 TABLET ORAL DAILY
COMMUNITY
End: 2018-01-01 | Stop reason: ALTCHOICE

## 2018-01-01 RX ORDER — NITROGLYCERIN 0.4 MG/1
TABLET SUBLINGUAL
Status: COMPLETED
Start: 2018-01-01 | End: 2018-01-01

## 2018-01-01 RX ORDER — MAGNESIUM GLUCONATE 27 MG(500)
250 TABLET ORAL 2 TIMES DAILY
Status: DISCONTINUED | OUTPATIENT
Start: 2018-01-01 | End: 2018-01-01 | Stop reason: HOSPADM

## 2018-01-01 RX ORDER — GLIPIZIDE 5 MG/1
5 TABLET ORAL
Status: DISCONTINUED | OUTPATIENT
Start: 2018-01-01 | End: 2018-01-01

## 2018-01-01 RX ORDER — NITROGLYCERIN 0.4 MG/1
0.4 TABLET SUBLINGUAL EVERY 5 MIN PRN
COMMUNITY

## 2018-01-01 RX ORDER — CARVEDILOL 25 MG/1
25 TABLET ORAL 2 TIMES DAILY WITH MEALS
Status: DISCONTINUED | OUTPATIENT
Start: 2018-01-01 | End: 2018-01-01

## 2018-01-01 RX ORDER — NYSTATIN 100000 U/G
OINTMENT TOPICAL 2 TIMES DAILY
Status: DISCONTINUED | OUTPATIENT
Start: 2018-01-01 | End: 2018-01-01 | Stop reason: HOSPADM

## 2018-01-01 RX ORDER — LORAZEPAM 1 MG/1
1 TABLET ORAL DAILY
Qty: 15 TABLET | Refills: 0 | Status: SHIPPED | OUTPATIENT
Start: 2018-01-01 | End: 2018-01-01

## 2018-01-01 RX ORDER — WARFARIN SODIUM 6 MG/1
6 TABLET ORAL
COMMUNITY
End: 2018-01-01

## 2018-01-01 RX ORDER — LANOLIN ALCOHOL/MO/W.PET/CERES
9 CREAM (GRAM) TOPICAL NIGHTLY
Status: DISCONTINUED | OUTPATIENT
Start: 2018-01-01 | End: 2018-01-01 | Stop reason: HOSPADM

## 2018-01-01 RX ORDER — METFORMIN HYDROCHLORIDE 500 MG/1
500 TABLET, EXTENDED RELEASE ORAL 2 TIMES DAILY WITH MEALS
Status: DISCONTINUED | OUTPATIENT
Start: 2018-01-01 | End: 2018-01-01 | Stop reason: HOSPADM

## 2018-01-01 RX ORDER — WARFARIN SODIUM 3 MG/1
3 TABLET ORAL
Status: DISCONTINUED | OUTPATIENT
Start: 2018-01-01 | End: 2018-01-01 | Stop reason: HOSPADM

## 2018-01-01 RX ORDER — ASPIRIN 325 MG
325 TABLET ORAL ONCE
Status: DISCONTINUED | OUTPATIENT
Start: 2018-01-01 | End: 2018-01-01

## 2018-01-01 RX ORDER — B-COMPLEX WITH VITAMIN C
1 TABLET ORAL DAILY
COMMUNITY

## 2018-01-01 RX ORDER — CLINDAMYCIN HYDROCHLORIDE 300 MG/1
300 CAPSULE ORAL 4 TIMES DAILY
COMMUNITY
Start: 2018-01-01 | End: 2018-01-01

## 2018-01-01 RX ORDER — GLIPIZIDE 5 MG/1
5 TABLET ORAL
Status: DISCONTINUED | OUTPATIENT
Start: 2018-01-01 | End: 2018-01-01 | Stop reason: HOSPADM

## 2018-01-01 RX ORDER — TORSEMIDE 20 MG/1
40 TABLET ORAL DAILY
Qty: 30 TABLET | Refills: 3 | Status: ON HOLD | OUTPATIENT
Start: 2018-01-01 | End: 2018-01-01

## 2018-01-01 RX ORDER — PETROLATUM 42 G/100G
OINTMENT TOPICAL PRN
Status: DISCONTINUED | OUTPATIENT
Start: 2018-01-01 | End: 2018-01-01 | Stop reason: HOSPADM

## 2018-01-01 RX ORDER — CLOTRIMAZOLE AND BETAMETHASONE DIPROPIONATE 10; .64 MG/G; MG/G
CREAM TOPICAL 2 TIMES DAILY
Status: DISCONTINUED | OUTPATIENT
Start: 2018-01-01 | End: 2018-01-01 | Stop reason: HOSPADM

## 2018-01-01 RX ORDER — CARVEDILOL 6.25 MG/1
25 TABLET ORAL 2 TIMES DAILY WITH MEALS
Status: DISCONTINUED | OUTPATIENT
Start: 2018-01-01 | End: 2018-01-01 | Stop reason: HOSPADM

## 2018-01-01 RX ORDER — CLOTRIMAZOLE 1 %
CREAM (GRAM) TOPICAL 2 TIMES DAILY
Status: DISCONTINUED | OUTPATIENT
Start: 2018-01-01 | End: 2018-01-01 | Stop reason: HOSPADM

## 2018-01-01 RX ORDER — PETROLATUM 42 G/100G
OINTMENT TOPICAL DAILY
Status: DISCONTINUED | OUTPATIENT
Start: 2018-01-01 | End: 2018-01-01 | Stop reason: HOSPADM

## 2018-01-01 RX ORDER — FLUCONAZOLE 150 MG/1
150 TABLET ORAL DAILY
Qty: 2 TABLET | Refills: 0 | Status: SHIPPED | OUTPATIENT
Start: 2018-01-01 | End: 2018-01-01

## 2018-01-01 RX ADMIN — Medication 10 ML: at 21:59

## 2018-01-01 RX ADMIN — CLINDAMYCIN HYDROCHLORIDE 300 MG: 150 CAPSULE ORAL at 14:35

## 2018-01-01 RX ADMIN — CLINDAMYCIN HYDROCHLORIDE 300 MG: 150 CAPSULE ORAL at 08:34

## 2018-01-01 RX ADMIN — ATORVASTATIN CALCIUM 80 MG: 40 TABLET, FILM COATED ORAL at 21:07

## 2018-01-01 RX ADMIN — METFORMIN HYDROCHLORIDE 500 MG: 500 TABLET, EXTENDED RELEASE ORAL at 17:01

## 2018-01-01 RX ADMIN — CLOPIDOGREL 75 MG: 75 TABLET, FILM COATED ORAL at 08:55

## 2018-01-01 RX ADMIN — ISOSORBIDE MONONITRATE 30 MG: 30 TABLET ORAL at 10:08

## 2018-01-01 RX ADMIN — Medication 10 ML: at 07:58

## 2018-01-01 RX ADMIN — METFORMIN HYDROCHLORIDE 500 MG: 500 TABLET, EXTENDED RELEASE ORAL at 08:22

## 2018-01-01 RX ADMIN — SACUBITRIL AND VALSARTAN 1 TABLET: 24; 26 TABLET, FILM COATED ORAL at 07:58

## 2018-01-01 RX ADMIN — ASPIRIN 81 MG 81 MG: 81 TABLET ORAL at 22:00

## 2018-01-01 RX ADMIN — LOSARTAN POTASSIUM 25 MG: 25 TABLET, FILM COATED ORAL at 10:09

## 2018-01-01 RX ADMIN — INSULIN LISPRO 2 UNITS: 100 INJECTION, SOLUTION INTRAVENOUS; SUBCUTANEOUS at 18:36

## 2018-01-01 RX ADMIN — INSULIN LISPRO 2 UNITS: 100 INJECTION, SOLUTION INTRAVENOUS; SUBCUTANEOUS at 07:56

## 2018-01-01 RX ADMIN — Medication 10 ML: at 21:05

## 2018-01-01 RX ADMIN — TORSEMIDE 40 MG: 20 TABLET ORAL at 21:51

## 2018-01-01 RX ADMIN — PETROLATUM: 42 OINTMENT TOPICAL at 14:38

## 2018-01-01 RX ADMIN — ISOSORBIDE MONONITRATE 30 MG: 30 TABLET ORAL at 08:22

## 2018-01-01 RX ADMIN — ASPIRIN 325 MG: 325 TABLET ORAL at 11:47

## 2018-01-01 RX ADMIN — WARFARIN SODIUM 5 MG: 5 TABLET ORAL at 17:01

## 2018-01-01 RX ADMIN — CLINDAMYCIN HYDROCHLORIDE 300 MG: 150 CAPSULE ORAL at 22:04

## 2018-01-01 RX ADMIN — INSULIN LISPRO 2 UNITS: 100 INJECTION, SOLUTION INTRAVENOUS; SUBCUTANEOUS at 20:11

## 2018-01-01 RX ADMIN — ATORVASTATIN CALCIUM 80 MG: 40 TABLET, FILM COATED ORAL at 22:04

## 2018-01-01 RX ADMIN — INSULIN LISPRO 4 UNITS: 100 INJECTION, SOLUTION INTRAVENOUS; SUBCUTANEOUS at 12:15

## 2018-01-01 RX ADMIN — CARVEDILOL 6.25 MG: 6.25 TABLET, FILM COATED ORAL at 08:22

## 2018-01-01 RX ADMIN — Medication 10 ML: at 20:32

## 2018-01-01 RX ADMIN — Medication 12 MILLICURIE: at 09:02

## 2018-01-01 RX ADMIN — ATORVASTATIN CALCIUM 80 MG: 40 TABLET, FILM COATED ORAL at 21:39

## 2018-01-01 RX ADMIN — PANTOPRAZOLE SODIUM 40 MG: 40 TABLET, DELAYED RELEASE ORAL at 06:33

## 2018-01-01 RX ADMIN — PETROLATUM: 42 OINTMENT TOPICAL at 21:07

## 2018-01-01 RX ADMIN — ASPIRIN 81 MG 81 MG: 81 TABLET ORAL at 20:47

## 2018-01-01 RX ADMIN — PHYTONADIONE 10 MG: 5 TABLET ORAL at 08:34

## 2018-01-01 RX ADMIN — CLOPIDOGREL BISULFATE 75 MG: 75 TABLET ORAL at 12:56

## 2018-01-01 RX ADMIN — INSULIN LISPRO 1 UNITS: 100 INJECTION, SOLUTION INTRAVENOUS; SUBCUTANEOUS at 08:01

## 2018-01-01 RX ADMIN — WARFARIN SODIUM 5 MG: 5 TABLET ORAL at 18:33

## 2018-01-01 RX ADMIN — CARVEDILOL 6.25 MG: 6.25 TABLET, FILM COATED ORAL at 10:09

## 2018-01-01 RX ADMIN — ATORVASTATIN CALCIUM 80 MG: 40 TABLET, FILM COATED ORAL at 21:51

## 2018-01-01 RX ADMIN — ACETAMINOPHEN 650 MG: 325 TABLET, FILM COATED ORAL at 13:49

## 2018-01-01 RX ADMIN — TORSEMIDE 40 MG: 20 TABLET ORAL at 07:21

## 2018-01-01 RX ADMIN — CLOTRIMAZOLE: 10 CREAM TOPICAL at 20:10

## 2018-01-01 RX ADMIN — CLOPIDOGREL 75 MG: 75 TABLET, FILM COATED ORAL at 07:56

## 2018-01-01 RX ADMIN — Medication 400 MG: at 08:55

## 2018-01-01 RX ADMIN — MELATONIN 3 MG ORAL TABLET 9 MG: 3 TABLET ORAL at 21:03

## 2018-01-01 RX ADMIN — CLINDAMYCIN HYDROCHLORIDE 300 MG: 150 CAPSULE ORAL at 13:19

## 2018-01-01 RX ADMIN — CARVEDILOL 6.25 MG: 6.25 TABLET, FILM COATED ORAL at 17:40

## 2018-01-01 RX ADMIN — SACUBITRIL AND VALSARTAN 1 TABLET: 24; 26 TABLET, FILM COATED ORAL at 12:07

## 2018-01-01 RX ADMIN — SACUBITRIL AND VALSARTAN 1 TABLET: 24; 26 TABLET, FILM COATED ORAL at 20:09

## 2018-01-01 RX ADMIN — Medication 250 MG: at 21:59

## 2018-01-01 RX ADMIN — GLIPIZIDE 5 MG: 5 TABLET ORAL at 06:16

## 2018-01-01 RX ADMIN — Medication 10 ML: at 10:07

## 2018-01-01 RX ADMIN — NYSTATIN: 100000 OINTMENT TOPICAL at 21:07

## 2018-01-01 RX ADMIN — Medication 1000 MG: at 08:22

## 2018-01-01 RX ADMIN — SACUBITRIL AND VALSARTAN 1 TABLET: 24; 26 TABLET, FILM COATED ORAL at 08:58

## 2018-01-01 RX ADMIN — CLOTRIMAZOLE AND BETAMETHASONE DIPROPIONATE: 10; .5 CREAM TOPICAL at 21:39

## 2018-01-01 RX ADMIN — INSULIN LISPRO 2 UNITS: 100 INJECTION, SOLUTION INTRAVENOUS; SUBCUTANEOUS at 06:47

## 2018-01-01 RX ADMIN — ASPIRIN 81 MG CHEWABLE TABLET 81 MG: 81 TABLET CHEWABLE at 22:04

## 2018-01-01 RX ADMIN — Medication 10 ML: at 08:34

## 2018-01-01 RX ADMIN — ASPIRIN 81 MG 81 MG: 81 TABLET ORAL at 20:57

## 2018-01-01 RX ADMIN — PETROLATUM: 42 OINTMENT TOPICAL at 10:10

## 2018-01-01 RX ADMIN — GLIPIZIDE 5 MG: 5 TABLET ORAL at 06:53

## 2018-01-01 RX ADMIN — Medication 250 MG: at 08:24

## 2018-01-01 RX ADMIN — CLINDAMYCIN HYDROCHLORIDE 300 MG: 150 CAPSULE ORAL at 08:44

## 2018-01-01 RX ADMIN — METFORMIN HYDROCHLORIDE 500 MG: 500 TABLET, EXTENDED RELEASE ORAL at 17:55

## 2018-01-01 RX ADMIN — VALSARTAN 20 MG: 80 TABLET ORAL at 08:44

## 2018-01-01 RX ADMIN — CLOTRIMAZOLE: 10 CREAM TOPICAL at 11:02

## 2018-01-01 RX ADMIN — ISOSORBIDE MONONITRATE 30 MG: 30 TABLET ORAL at 08:44

## 2018-01-01 RX ADMIN — CLINDAMYCIN HYDROCHLORIDE 300 MG: 150 CAPSULE ORAL at 21:39

## 2018-01-01 RX ADMIN — CARVEDILOL 6.25 MG: 6.25 TABLET, FILM COATED ORAL at 18:33

## 2018-01-01 RX ADMIN — AMIODARONE HYDROCHLORIDE 100 MG: 200 TABLET ORAL at 09:35

## 2018-01-01 RX ADMIN — ASPIRIN 81 MG 81 MG: 81 TABLET ORAL at 19:37

## 2018-01-01 RX ADMIN — Medication 10 ML: at 21:46

## 2018-01-01 RX ADMIN — METFORMIN HYDROCHLORIDE 500 MG: 500 TABLET, EXTENDED RELEASE ORAL at 18:33

## 2018-01-01 RX ADMIN — PETROLATUM: 42 OINTMENT TOPICAL at 22:01

## 2018-01-01 RX ADMIN — INSULIN LISPRO 2 UNITS: 100 INJECTION, SOLUTION INTRAVENOUS; SUBCUTANEOUS at 11:18

## 2018-01-01 RX ADMIN — SACUBITRIL AND VALSARTAN 1 TABLET: 24; 26 TABLET, FILM COATED ORAL at 20:32

## 2018-01-01 RX ADMIN — PANTOPRAZOLE SODIUM 40 MG: 40 TABLET, DELAYED RELEASE ORAL at 09:09

## 2018-01-01 RX ADMIN — CARVEDILOL 6.25 MG: 6.25 TABLET, FILM COATED ORAL at 16:51

## 2018-01-01 RX ADMIN — ATORVASTATIN CALCIUM 80 MG: 40 TABLET, FILM COATED ORAL at 20:32

## 2018-01-01 RX ADMIN — AMIODARONE HYDROCHLORIDE 100 MG: 200 TABLET ORAL at 08:44

## 2018-01-01 RX ADMIN — AMIODARONE HYDROCHLORIDE 100 MG: 200 TABLET ORAL at 10:10

## 2018-01-01 RX ADMIN — TORSEMIDE 40 MG: 20 TABLET ORAL at 10:08

## 2018-01-01 RX ADMIN — CARVEDILOL 6.25 MG: 6.25 TABLET, FILM COATED ORAL at 19:09

## 2018-01-01 RX ADMIN — NYSTATIN: 100000 OINTMENT TOPICAL at 08:22

## 2018-01-01 RX ADMIN — Medication 10 ML: at 09:37

## 2018-01-01 RX ADMIN — Medication 10 ML: at 21:07

## 2018-01-01 RX ADMIN — Medication 1000 MG: at 10:08

## 2018-01-01 RX ADMIN — CARVEDILOL 25 MG: 6.25 TABLET, FILM COATED ORAL at 08:35

## 2018-01-01 RX ADMIN — WARFARIN SODIUM 6 MG: 6 TABLET ORAL at 17:21

## 2018-01-01 RX ADMIN — MAGNESIUM HYDROXIDE 30 ML: 400 SUSPENSION ORAL at 10:07

## 2018-01-01 RX ADMIN — FUROSEMIDE 20 MG: 10 INJECTION, SOLUTION INTRAVENOUS at 16:05

## 2018-01-01 RX ADMIN — Medication 10 ML: at 08:55

## 2018-01-01 RX ADMIN — Medication 400 MG: at 07:56

## 2018-01-01 RX ADMIN — INSULIN LISPRO 4 UNITS: 100 INJECTION, SOLUTION INTRAVENOUS; SUBCUTANEOUS at 13:08

## 2018-01-01 RX ADMIN — PANTOPRAZOLE SODIUM 40 MG: 40 TABLET, DELAYED RELEASE ORAL at 06:24

## 2018-01-01 RX ADMIN — GLIPIZIDE 5 MG: 5 TABLET ORAL at 08:35

## 2018-01-01 RX ADMIN — FUROSEMIDE 40 MG: 10 INJECTION, SOLUTION INTRAMUSCULAR; INTRAVENOUS at 09:34

## 2018-01-01 RX ADMIN — SODIUM CHLORIDE 500 ML: 9 INJECTION, SOLUTION INTRAVENOUS at 13:39

## 2018-01-01 RX ADMIN — MUPIROCIN: 20 OINTMENT TOPICAL at 13:38

## 2018-01-01 RX ADMIN — CLOPIDOGREL 75 MG: 75 TABLET, FILM COATED ORAL at 08:57

## 2018-01-01 RX ADMIN — METFORMIN HYDROCHLORIDE 500 MG: 500 TABLET, EXTENDED RELEASE ORAL at 10:08

## 2018-01-01 RX ADMIN — INSULIN GLARGINE 28 UNITS: 100 INJECTION, SOLUTION SUBCUTANEOUS at 20:58

## 2018-01-01 RX ADMIN — CARVEDILOL 6.25 MG: 6.25 TABLET, FILM COATED ORAL at 09:34

## 2018-01-01 RX ADMIN — SPIRONOLACTONE 12.5 MG: 25 TABLET ORAL at 08:36

## 2018-01-01 RX ADMIN — FUROSEMIDE 40 MG: 10 INJECTION, SOLUTION INTRAMUSCULAR; INTRAVENOUS at 08:34

## 2018-01-01 RX ADMIN — ACETAMINOPHEN 650 MG: 325 TABLET, FILM COATED ORAL at 21:58

## 2018-01-01 RX ADMIN — SPIRONOLACTONE 12.5 MG: 25 TABLET ORAL at 08:44

## 2018-01-01 RX ADMIN — FUROSEMIDE 40 MG: 10 INJECTION, SOLUTION INTRAMUSCULAR; INTRAVENOUS at 11:16

## 2018-01-01 RX ADMIN — INSULIN GLARGINE 28 UNITS: 100 INJECTION, SOLUTION SUBCUTANEOUS at 20:10

## 2018-01-01 RX ADMIN — CARVEDILOL 6.25 MG: 6.25 TABLET, FILM COATED ORAL at 07:58

## 2018-01-01 RX ADMIN — ISOSORBIDE MONONITRATE 30 MG: 60 TABLET ORAL at 06:38

## 2018-01-01 RX ADMIN — PANTOPRAZOLE SODIUM 40 MG: 40 TABLET, DELAYED RELEASE ORAL at 06:16

## 2018-01-01 RX ADMIN — TORSEMIDE 40 MG: 20 TABLET ORAL at 21:09

## 2018-01-01 RX ADMIN — PETROLATUM: 42 OINTMENT TOPICAL at 08:22

## 2018-01-01 RX ADMIN — Medication 250 MG: at 21:50

## 2018-01-01 RX ADMIN — Medication 10 ML: at 08:44

## 2018-01-01 RX ADMIN — SODIUM CHLORIDE: 9 INJECTION, SOLUTION INTRAVENOUS at 10:07

## 2018-01-01 RX ADMIN — CARVEDILOL 6.25 MG: 6.25 TABLET, FILM COATED ORAL at 17:55

## 2018-01-01 RX ADMIN — SPIRONOLACTONE 12.5 MG: 25 TABLET ORAL at 09:36

## 2018-01-01 RX ADMIN — INSULIN LISPRO 2 UNITS: 100 INJECTION, SOLUTION INTRAVENOUS; SUBCUTANEOUS at 06:19

## 2018-01-01 RX ADMIN — FUROSEMIDE 40 MG: 10 INJECTION, SOLUTION INTRAMUSCULAR; INTRAVENOUS at 17:06

## 2018-01-01 RX ADMIN — SPIRONOLACTONE 12.5 MG: 25 TABLET ORAL at 10:07

## 2018-01-01 RX ADMIN — PETROLATUM: 42 OINTMENT TOPICAL at 08:50

## 2018-01-01 RX ADMIN — WARFARIN SODIUM 5 MG: 5 TABLET ORAL at 19:07

## 2018-01-01 RX ADMIN — CLOTRIMAZOLE: 10 CREAM TOPICAL at 20:32

## 2018-01-01 RX ADMIN — ASPIRIN 81 MG 81 MG: 81 TABLET ORAL at 20:10

## 2018-01-01 RX ADMIN — CLOTRIMAZOLE AND BETAMETHASONE DIPROPIONATE: 10; .5 CREAM TOPICAL at 08:50

## 2018-01-01 RX ADMIN — SODIUM CHLORIDE 500 ML: 9 INJECTION, SOLUTION INTRAVENOUS at 20:42

## 2018-01-01 RX ADMIN — INSULIN GLARGINE 28 UNITS: 100 INJECTION, SOLUTION SUBCUTANEOUS at 20:32

## 2018-01-01 RX ADMIN — CARVEDILOL 6.25 MG: 6.25 TABLET, FILM COATED ORAL at 16:37

## 2018-01-01 RX ADMIN — INSULIN LISPRO 2 UNITS: 100 INJECTION, SOLUTION INTRAVENOUS; SUBCUTANEOUS at 16:02

## 2018-01-01 RX ADMIN — CARVEDILOL 6.25 MG: 6.25 TABLET, FILM COATED ORAL at 19:07

## 2018-01-01 RX ADMIN — Medication 400 MG: at 20:57

## 2018-01-01 RX ADMIN — ATORVASTATIN CALCIUM 80 MG: 40 TABLET, FILM COATED ORAL at 20:57

## 2018-01-01 RX ADMIN — ASPIRIN 81 MG CHEWABLE TABLET 81 MG: 81 TABLET CHEWABLE at 21:46

## 2018-01-01 RX ADMIN — CLINDAMYCIN HYDROCHLORIDE 300 MG: 150 CAPSULE ORAL at 16:37

## 2018-01-01 RX ADMIN — AMIODARONE HYDROCHLORIDE 100 MG: 200 TABLET ORAL at 08:35

## 2018-01-01 RX ADMIN — FUROSEMIDE 20 MG: 10 INJECTION, SOLUTION INTRAMUSCULAR; INTRAVENOUS at 16:23

## 2018-01-01 RX ADMIN — SODIUM CHLORIDE 250 ML: 9 INJECTION, SOLUTION INTRAVENOUS at 10:17

## 2018-01-01 RX ADMIN — ATORVASTATIN CALCIUM 80 MG: 40 TABLET, FILM COATED ORAL at 21:58

## 2018-01-01 RX ADMIN — Medication 400 MG: at 20:09

## 2018-01-01 RX ADMIN — Medication 10 ML: at 08:59

## 2018-01-01 RX ADMIN — ACETAMINOPHEN 650 MG: 325 TABLET, FILM COATED ORAL at 17:57

## 2018-01-01 RX ADMIN — ATORVASTATIN CALCIUM 80 MG: 40 TABLET, FILM COATED ORAL at 20:10

## 2018-01-01 RX ADMIN — METFORMIN HYDROCHLORIDE 500 MG: 500 TABLET, EXTENDED RELEASE ORAL at 19:07

## 2018-01-01 RX ADMIN — AMIODARONE HYDROCHLORIDE 100 MG: 200 TABLET ORAL at 08:22

## 2018-01-01 RX ADMIN — ACETAMINOPHEN 650 MG: 325 TABLET ORAL at 23:15

## 2018-01-01 RX ADMIN — SPIRONOLACTONE 12.5 MG: 25 TABLET ORAL at 19:08

## 2018-01-01 RX ADMIN — INSULIN LISPRO 2 UNITS: 100 INJECTION, SOLUTION INTRAVENOUS; SUBCUTANEOUS at 17:41

## 2018-01-01 RX ADMIN — REGADENOSON 0.4 MG: 0.08 INJECTION, SOLUTION INTRAVENOUS at 10:42

## 2018-01-01 RX ADMIN — INSULIN LISPRO 3 UNITS: 100 INJECTION, SOLUTION INTRAVENOUS; SUBCUTANEOUS at 01:40

## 2018-01-01 RX ADMIN — NITROGLYCERIN: 0.4 TABLET SUBLINGUAL at 23:24

## 2018-01-01 RX ADMIN — FLUCONAZOLE 200 MG: 100 TABLET ORAL at 04:10

## 2018-01-01 RX ADMIN — Medication: at 08:37

## 2018-01-01 RX ADMIN — Medication 1000 MG: at 12:56

## 2018-01-01 RX ADMIN — ISOSORBIDE MONONITRATE 120 MG: 60 TABLET, EXTENDED RELEASE ORAL at 07:56

## 2018-01-01 RX ADMIN — CARVEDILOL 6.25 MG: 6.25 TABLET, FILM COATED ORAL at 17:01

## 2018-01-01 RX ADMIN — Medication 400 MG: at 08:58

## 2018-01-01 RX ADMIN — INSULIN LISPRO 1 UNITS: 100 INJECTION, SOLUTION INTRAVENOUS; SUBCUTANEOUS at 20:57

## 2018-01-01 RX ADMIN — Medication 10 ML: at 07:56

## 2018-01-01 RX ADMIN — FENTANYL CITRATE 25 MCG: 50 INJECTION, SOLUTION INTRAMUSCULAR; INTRAVENOUS at 00:35

## 2018-01-01 RX ADMIN — Medication 10 ML: at 08:27

## 2018-01-01 RX ADMIN — Medication 34 MILLICURIE: at 10:43

## 2018-01-01 RX ADMIN — VALSARTAN 20 MG: 80 TABLET ORAL at 08:36

## 2018-01-01 RX ADMIN — ISOSORBIDE MONONITRATE 30 MG: 30 TABLET ORAL at 16:37

## 2018-01-01 RX ADMIN — CLOTRIMAZOLE: 10 CREAM TOPICAL at 08:58

## 2018-01-01 RX ADMIN — INSULIN LISPRO 2 UNITS: 100 INJECTION, SOLUTION INTRAVENOUS; SUBCUTANEOUS at 11:42

## 2018-01-01 RX ADMIN — ASPIRIN 81 MG 81 MG: 81 TABLET ORAL at 21:07

## 2018-01-01 RX ADMIN — CLOPIDOGREL 75 MG: 75 TABLET, FILM COATED ORAL at 07:58

## 2018-01-01 RX ADMIN — Medication 10 ML: at 22:05

## 2018-01-01 RX ADMIN — AMIODARONE HYDROCHLORIDE 100 MG: 200 TABLET ORAL at 19:08

## 2018-01-01 RX ADMIN — ASPIRIN 81 MG: 81 TABLET, CHEWABLE ORAL at 19:37

## 2018-01-01 RX ADMIN — Medication 1000 MG: at 09:34

## 2018-01-01 RX ADMIN — INSULIN LISPRO 1 UNITS: 100 INJECTION, SOLUTION INTRAVENOUS; SUBCUTANEOUS at 20:33

## 2018-01-01 RX ADMIN — INSULIN LISPRO 2 UNITS: 100 INJECTION, SOLUTION INTRAVENOUS; SUBCUTANEOUS at 08:39

## 2018-01-01 RX ADMIN — ACETAMINOPHEN 650 MG: 325 TABLET, FILM COATED ORAL at 09:33

## 2018-01-01 RX ADMIN — Medication 10 ML: at 20:10

## 2018-01-01 RX ADMIN — INSULIN LISPRO 1 UNITS: 100 INJECTION, SOLUTION INTRAVENOUS; SUBCUTANEOUS at 21:09

## 2018-01-01 RX ADMIN — METFORMIN HYDROCHLORIDE 500 MG: 500 TABLET, EXTENDED RELEASE ORAL at 08:01

## 2018-01-01 RX ADMIN — NYSTATIN: 100000 OINTMENT TOPICAL at 22:01

## 2018-01-01 RX ADMIN — ASPIRIN 81 MG 81 MG: 81 TABLET ORAL at 21:50

## 2018-01-01 RX ADMIN — Medication 400 MG: at 20:34

## 2018-01-01 RX ADMIN — NYSTATIN: 100000 OINTMENT TOPICAL at 09:36

## 2018-01-01 RX ADMIN — MELATONIN 3 MG ORAL TABLET 9 MG: 3 TABLET ORAL at 20:32

## 2018-01-01 RX ADMIN — MELATONIN 3 MG ORAL TABLET 9 MG: 3 TABLET ORAL at 20:09

## 2018-01-01 RX ADMIN — CARVEDILOL 6.25 MG: 6.25 TABLET, FILM COATED ORAL at 16:02

## 2018-01-01 RX ADMIN — SPIRONOLACTONE 12.5 MG: 25 TABLET ORAL at 08:22

## 2018-01-01 RX ADMIN — CARVEDILOL 6.25 MG: 6.25 TABLET, FILM COATED ORAL at 08:44

## 2018-01-01 RX ADMIN — PETROLATUM: 42 OINTMENT TOPICAL at 09:36

## 2018-01-01 RX ADMIN — Medication 250 MG: at 10:08

## 2018-01-01 RX ADMIN — FUROSEMIDE 40 MG: 10 INJECTION, SOLUTION INTRAMUSCULAR; INTRAVENOUS at 08:44

## 2018-01-01 RX ADMIN — CARVEDILOL 6.25 MG: 6.25 TABLET, FILM COATED ORAL at 08:34

## 2018-01-01 RX ADMIN — PANTOPRAZOLE SODIUM 40 MG: 40 TABLET, DELAYED RELEASE ORAL at 06:53

## 2018-01-01 RX ADMIN — Medication 250 MG: at 21:06

## 2018-01-01 RX ADMIN — Medication 250 MG: at 09:35

## 2018-01-01 RX ADMIN — GLIPIZIDE 5 MG: 5 TABLET ORAL at 06:24

## 2018-01-01 RX ADMIN — Medication 400 MG: at 07:58

## 2018-01-01 RX ADMIN — CARVEDILOL 6.25 MG: 6.25 TABLET, FILM COATED ORAL at 07:56

## 2018-01-01 ASSESSMENT — ENCOUNTER SYMPTOMS
BACK PAIN: 0
BACK PAIN: 0
DIARRHEA: 0
SHORTNESS OF BREATH: 1
CONSTIPATION: 0
CHEST TIGHTNESS: 0
COLOR CHANGE: 0
NAUSEA: 0
COUGH: 1
ABDOMINAL PAIN: 0
NAUSEA: 0
SINUS PAIN: 1
COUGH: 0
VOMITING: 0
SORE THROAT: 0
SHORTNESS OF BREATH: 0
ABDOMINAL PAIN: 0
DIARRHEA: 0
SORE THROAT: 0
VOMITING: 0

## 2018-01-01 ASSESSMENT — PAIN SCALES - GENERAL
PAINLEVEL_OUTOF10: 0
PAINLEVEL_OUTOF10: 8
PAINLEVEL_OUTOF10: 0
PAINLEVEL_OUTOF10: 4
PAINLEVEL_OUTOF10: 0
PAINLEVEL_OUTOF10: 3
PAINLEVEL_OUTOF10: 0
PAINLEVEL_OUTOF10: 2
PAINLEVEL_OUTOF10: 0
PAINLEVEL_OUTOF10: 5
PAINLEVEL_OUTOF10: 0
PAINLEVEL_OUTOF10: 7
PAINLEVEL_OUTOF10: 0
PAINLEVEL_OUTOF10: 1
PAINLEVEL_OUTOF10: 0
PAINLEVEL_OUTOF10: 3
PAINLEVEL_OUTOF10: 0
PAINLEVEL_OUTOF10: 5
PAINLEVEL_OUTOF10: 0
PAINLEVEL_OUTOF10: 5
PAINLEVEL_OUTOF10: 0
PAINLEVEL_OUTOF10: 0

## 2018-01-01 ASSESSMENT — PAIN DESCRIPTION - DESCRIPTORS
DESCRIPTORS: DISCOMFORT
DESCRIPTORS: DISCOMFORT
DESCRIPTORS: TIGHTNESS;PRESSURE
DESCRIPTORS: ACHING;DISCOMFORT;HEADACHE
DESCRIPTORS: DISCOMFORT;SORE
DESCRIPTORS: ACHING;HEADACHE

## 2018-01-01 ASSESSMENT — PAIN DESCRIPTION - PAIN TYPE
TYPE: ACUTE PAIN

## 2018-01-01 ASSESSMENT — PAIN DESCRIPTION - ONSET
ONSET: SUDDEN
ONSET: SUDDEN
ONSET: GRADUAL
ONSET: SUDDEN

## 2018-01-01 ASSESSMENT — PAIN DESCRIPTION - LOCATION
LOCATION: CHEST
LOCATION: HEAD
LOCATION: COCCYX
LOCATION: HEAD

## 2018-01-01 ASSESSMENT — PAIN DESCRIPTION - FREQUENCY
FREQUENCY: INTERMITTENT
FREQUENCY: CONTINUOUS

## 2018-01-01 ASSESSMENT — PAIN DESCRIPTION - ORIENTATION
ORIENTATION: MID
ORIENTATION: LEFT
ORIENTATION: ANTERIOR
ORIENTATION: MID
ORIENTATION: MID

## 2018-01-01 ASSESSMENT — PAIN DESCRIPTION - PROGRESSION
CLINICAL_PROGRESSION: GRADUALLY WORSENING
CLINICAL_PROGRESSION: GRADUALLY WORSENING
CLINICAL_PROGRESSION: NOT CHANGED

## 2018-03-26 PROBLEM — I21.4 NSTEMI (NON-ST ELEVATED MYOCARDIAL INFARCTION) (HCC): Status: ACTIVE | Noted: 2018-01-01

## 2018-03-26 PROBLEM — L03.115 CELLULITIS OF RIGHT LOWER EXTREMITY: Status: RESOLVED | Noted: 2017-01-01 | Resolved: 2018-01-01

## 2018-03-26 PROBLEM — I48.0 PAROXYSMAL ATRIAL FIBRILLATION (HCC): Chronic | Status: ACTIVE | Noted: 2018-01-01

## 2018-03-26 PROBLEM — I10 ESSENTIAL HYPERTENSION: Chronic | Status: ACTIVE | Noted: 2018-01-01

## 2018-03-26 PROBLEM — E78.5 HYPERLIPIDEMIA LDL GOAL <100: Chronic | Status: ACTIVE | Noted: 2018-01-01

## 2018-03-26 PROBLEM — I25.10 CAD (CORONARY ARTERY DISEASE), NATIVE CORONARY ARTERY: Chronic | Status: ACTIVE | Noted: 2018-01-01

## 2018-03-26 NOTE — ED PROVIDER NOTES
HPI:   Ranjana Talamantes is a 78 y.o. male presenting to the ED for chest pain, beginning 2 days ago. The complaint has been intermittent, moderate in severity. Pt has extensive cardiac hx with multiple stents placed and CABG procedures, but hasn't follow up with cardiology in some time. Pt complains of intermittent left sided chest pain radiating down his left arm that improves with nitro. He took 3 baby ASA then called EMS for assistance to bring him to the ED for evaluation. Patient denies fever/chills, cough, congestion, shortness of breath, edema, headache, abdominal pain, nausea, vomiting, diarrhea, constipation, dysuria, hematuria, trauma, neck or back pain or other complaints. Note- he is asymptomatic at time of interview. Pacemaker with defibrillator placed. ROS:   Pertinent positives and negatives are stated within HPI, all other systems reviewed and are negative.    --------------------------------------------- PAST HISTORY ---------------------------------------------  Past Medical History:  has a past medical history of Ascending aortic aneurysm ([address] Atrial fibrillation ([address] CAD (coronary artery disease); Chronic combined systolic and diastolic CHF (congestive heart failure) ([address] History of MI (myocardial infarction); Hyperlipidemia; Hypertension; Kidney stones; Mitral regurgitation; Neuropathy ([address] Non-insulin dependent type 2 diabetes mellitus ([address] JEANNE (obstructive sleep apnea); Pacemaker; Pleural effusion; and Systolic heart failure ([address] Past Surgical History:  has a past surgical history that includes Cardiac defibrillator placement (2007); Cardiac pacemaker placement (02/23/07); Coronary angioplasty with stent (09/02/03, 05/12/09); Coronary artery bypass graft (1986); Coronary artery bypass graft (2000); Inguinal hernia repair; Wrist surgery (2001); Lithotripsy;  Colonoscopy; other surgical history (2011); other surgical history (11/2011); ECHO Compl W Dop Color Flow (10/14/2012); Coronary angioplasty with stent (11/2012); Cardiac defibrillator placement (2007); and Cardiac defibrillator placement (2011). Social History:  reports that he quit smoking about 43 years ago. His smoking use included Cigarettes. He has a 7.50 pack-year smoking history. He has never used smokeless tobacco. He reports that he drinks alcohol. He reports that he does not use drugs. Family History: family history includes Cancer in his mother; Heart Attack in his mother; Heart Attack (age of onset: 27) in his son; Heart Disease in his father and mother; Prostate Cancer in his father. The patients home medications have been reviewed. Allergies: Ancef [cefazolin sodium];  Bacitracin; Biaxin [clarithromycin]; and Doxycycline    -------------------------------------------------- RESULTS -------------------------------------------------  All laboratory and radiology results have been personally reviewed by myself   LABS:  Results for orders placed or performed during the hospital encounter of 03/26/18   CBC Auto Differential   Result Value Ref Range    WBC 13.0 (H) 4.5 - 11.5 E9/L    RBC 4.01 3.80 - 5.80 E12/L    Hemoglobin 13.2 12.5 - 16.5 g/dL    Hematocrit 40.4 37.0 - 54.0 %    .7 (H) 80.0 - 99.9 fL    MCH 32.9 26.0 - 35.0 pg    MCHC 32.7 32.0 - 34.5 %    RDW 14.8 11.5 - 15.0 fL    Platelets 213 603 - 154 E9/L    MPV 12.8 (H) 7.0 - 12.0 fL    Neutrophils % 81.4 (H) 43.0 - 80.0 %    Immature Granulocytes % 0.5 0.0 - 5.0 %    Lymphocytes % 8.3 (L) 20.0 - 42.0 %    Monocytes % 8.1 2.0 - 12.0 %    Eosinophils % 1.2 0.0 - 6.0 %    Basophils % 0.5 0.0 - 2.0 %    Neutrophils # 10.59 (H) 1.80 - 7.30 E9/L    Immature Granulocytes # 0.07 E9/L    Lymphocytes # 1.08 (L) 1.50 - 4.00 E9/L    Monocytes # 1.05 (H) 0.10 - 0.95 E9/L    Eosinophils # 0.16 0.05 - 0.50 E9/L    Basophils # 0.06 0.00 - 0.20 E9/L   Protime-INR   Result Value Ref Range    Protime 58.2 (H) 9.3 - 12.4 sec    INR 5.0    APTT Result Value Ref Range    aPTT 41.7 (H) 24.5 - 35.1 sec   Troponin   Result Value Ref Range    Troponin 0.10 (H) 0.00 - 0.03 ng/mL   Brain Natriuretic Peptide   Result Value Ref Range    Pro-BNP 2,364 (H) 0 - 450 pg/mL   Comprehensive metabolic panel   Result Value Ref Range    Sodium 143 132 - 146 mmol/L    Potassium 4.2 3.5 - 5.0 mmol/L    Chloride 101 98 - 107 mmol/L    CO2 31 (H) 22 - 29 mmol/L    Anion Gap 11 7 - 16 mmol/L    Glucose 121 (H) 74 - 109 mg/dL    BUN 34 (H) 8 - 23 mg/dL    CREATININE 1.2 0.7 - 1.2 mg/dL    GFR Non-African American 59 >=60 mL/min/1.73    GFR African American >60     Calcium 9.6 8.6 - 10.2 mg/dL    Total Protein 6.5 6.4 - 8.3 g/dL    Alb 3.8 3.5 - 5.2 g/dL    Total Bilirubin 0.7 0.0 - 1.2 mg/dL    Alkaline Phosphatase 55 40 - 129 U/L    ALT 30 0 - 40 U/L    AST 19 0 - 39 U/L   Urinalysis   Result Value Ref Range    Color, UA Yellow Straw/Yellow    Clarity, UA Clear Clear    Glucose, Ur Negative Negative mg/dL    Bilirubin Urine Negative Negative    Ketones, Urine Negative Negative mg/dL    Specific Gravity, UA 1.010 1.005 - 1.030    Blood, Urine Negative Negative    pH, UA 6.0 5.0 - 9.0    Protein, UA Negative Negative mg/dL    Urobilinogen, Urine 0.2 <2.0 E.U./dL    Nitrite, Urine Negative Negative    Leukocyte Esterase, Urine Negative Negative   EKG 12 Lead   Result Value Ref Range    Ventricular Rate 71 BPM    Atrial Rate 70 BPM    QRS Duration 170 ms    Q-T Interval 492 ms    QTc Calculation (Bazett) 534 ms    R Axis -152 degrees    T Axis 99 degrees   EKG 12 Lead   Result Value Ref Range    Ventricular Rate 70 BPM    Atrial Rate 82 BPM    QRS Duration 166 ms    Q-T Interval 474 ms    QTc Calculation (Bazett) 511 ms    R Axis -140 degrees    T Axis 94 degrees       RADIOLOGY:  Interpreted by Radiologist.  XR CHEST PORTABLE   Final Result      1. Cardiomegaly and mild perihilar vascular congestion. 2. Status post prior CABG. 3. Left chest wall cardiac pacer defibrillator.

## 2018-03-27 PROBLEM — Z95.810 ICD (IMPLANTABLE CARDIOVERTER-DEFIBRILLATOR) IN PLACE: Status: ACTIVE | Noted: 2018-01-01

## 2018-03-27 PROBLEM — R79.1 SUPRATHERAPEUTIC INR: Status: ACTIVE | Noted: 2018-01-01

## 2018-03-27 PROBLEM — I48.0 PAROXYSMAL ATRIAL FIBRILLATION (HCC): Chronic | Status: RESOLVED | Noted: 2018-01-01 | Resolved: 2018-01-01

## 2018-03-27 PROBLEM — R79.1 SUPRATHERAPEUTIC INR: Status: RESOLVED | Noted: 2018-01-01 | Resolved: 2018-01-01

## 2018-03-27 PROBLEM — Z95.810 ICD (IMPLANTABLE CARDIOVERTER-DEFIBRILLATOR) IN PLACE: Chronic | Status: ACTIVE | Noted: 2018-01-01

## 2018-03-27 PROBLEM — I21.4 NSTEMI (NON-ST ELEVATED MYOCARDIAL INFARCTION) (HCC): Status: RESOLVED | Noted: 2018-01-01 | Resolved: 2018-01-01

## 2018-03-27 NOTE — CONSULTS
[facility])     Atrial fibrillation ([facility]     CAD (coronary artery disease)     Chronic combined systolic and diastolic CHF (congestive heart failure) [facility]     History of MI (myocardial infarction)     Hyperlipidemia     Hypertension     Kidney stones     Mitral regurgitation     Neuropathy ([facility])     Non-insulin dependent type 2 diabetes mellitus ([facility])     JEANNE (obstructive sleep apnea)     Pacemaker     Pleural effusion     Systolic heart failure ([facility] 10/14/2012    10/14/2012-echocardiogram revealed an LVEF of 35-40%       Past Surgical History:  Past Surgical History:   Procedure Laterality Date    CARDIAC DEFIBRILLATOR PLACEMENT  2007    CARDIAC DEFIBRILLATOR PLACEMENT  2007    dual chamber medtronic initial implant    CARDIAC DEFIBRILLATOR PLACEMENT  2011    ICD gen change    CARDIAC PACEMAKER PLACEMENT  02/23/07    COLONOSCOPY      CORONARY ANGIOPLASTY WITH STENT PLACEMENT  09/02/03, 05/12/09    2 STENTS    CORONARY ANGIOPLASTY WITH STENT PLACEMENT  11/2012    CORONARY ARTERY BYPASS GRAFT  1986    ACB X 5    CORONARY ARTERY BYPASS GRAFT  2000    REDO ACB X 4    ECHO COMPL W DOP COLOR FLOW  10/14/2012         INGUINAL HERNIA REPAIR      LITHOTRIPSY      2 times    OTHER SURGICAL HISTORY  2011    Replacement of Cardiac Defibrillator     OTHER SURGICAL HISTORY  11/2011    Replacement of cardiac pacemaker    WRIST SURGERY  2001    LEFT       Family History:  Family History   Problem Relation Age of Onset    Heart Disease Mother     Heart Attack Mother     Cancer Mother      MELANOMA    Heart Disease Father     Prostate Cancer Father     Heart Attack Son 30       Social History:  Social History     Social History    Marital status:      Spouse name: N/A    Number of children: N/A    Years of education: N/A     Occupational History    Not on file.      Social History Main Topics    Smoking status: Former Smoker     Packs/day: 0.50     Years: 15.00     Types: Cigarettes (TYLENOL) tablet 650 mg  650 mg Oral Q4H PRN Diane Garcia MD        magnesium hydroxide (MILK OF MAGNESIA) 400 MG/5ML suspension 30 mL  30 mL Oral Daily PRN Diane Garcia MD        ondansetron [facility]) injection 4 mg  4 mg Intravenous Q6H PRN Diane Garcia MD        glucose (GLUTOSE) 40 % oral gel 15 g  15 g Oral PRN Diane Garcia MD        dextrose 50 % solution 12.5 g  12.5 g Intravenous PRN Diane Garcia MD        glucagon (rDNA) injection 1 mg  1 mg Intramuscular PRN Diane Garcia MD        dextrose 5 % solution  100 mL/hr Intravenous PRN Diane Garcia MD        insulin lispro (HUMALOG) injection vial 0-12 Units  0-12 Units Subcutaneous TID WC Diane Garcia MD        insulin lispro (HUMALOG) injection vial 0-6 Units  0-6 Units Subcutaneous Nightly Diane Garcia MD   3 Units at 03/27/18 0140    insulin glargine (LANTUS) injection vial 10 Units  10 Units Subcutaneous Nightly Diane Garcia MD   Stopped at 03/27/18 0138    aspirin chewable tablet 81 mg  81 mg Oral Daily Diane Garcia MD        isosorbide mononitrate (IMDUR) extended release tablet 120 mg  120 mg Oral Daily Diane Garcia MD        phytonadione (VITAMIN K) tablet 10 mg  10 mg Oral Once Latonia Canchola MD        regadenoson [facility]) injection 0.4 mg  0.4 mg Intravenous ONCE PRN Patricia Abraham MD         Current Outpatient Prescriptions   Medication Sig Dispense Refill    valsartan (DIOVAN) 160 MG tablet Take 160 mg by mouth daily      melatonin 3 MG TABS tablet Take 9 mg by mouth nightly      acetaminophen (TYLENOL) 325 MG tablet Take 650 mg by mouth every 4 hours as needed for Pain or Fever      Vitamins-Lipotropics (BALANCED B-100 COMPLEX CR PO) Take 1 tablet by mouth daily      candesartan (ATACAND) 16 MG tablet Take 4 mg by mouth every morning (before breakfast)       metFORMIN (GLUCOPHAGE) 500 MG tablet Take 500 mg by mouth 2 times daily (with meals) Patient takes first dose at 6 am.     [facility] (108.9 kg)   SpO2 97%   BMI 34.44 kg/m²   Weight change: Wt Readings from Last 3 Encounters:   03/26/18 240 lb (108.9 kg)   03/04/18 240 lb (108.9 kg)   11/01/17 236 lb 11.2 oz (107.4 kg)         General: Awake, alert, oriented x3, no acute distress    HEENT: Normocephalic and atraumatic, pupils equal and round. Sclera nonicteric and oral mucosa moist.  Tongue and trachea midline. Neck: No JVD or bruits. No thyroid enlargement or adenopathy    Cardiac: Regular rhythm, no S3. Grade 1 systolic murmur at left sternal border. Apical impulse in large and 1+ edema lower extremities bilaterally with chronic stasis changes. Intact pulses in the upper and lower extremities bilaterally    Resp: Unlabored respirations at rest.  No wheezes, rales or rhonchi. Abdomen: soft, nontender, nondistended, no gross organomegaly or mass    Skin: Warm and dry, no cyanosis. Musculoskeletal: normal tone and strength in the upper and lower extremities bilaterally    Neuro: Moves all extremities appropriately to command.   Normal sensation to light touch in the upper and lower extremities bilaterally    Psych: Cooperative, and normal affect    Intake/Output:    Intake/Output Summary (Last 24 hours) at 03/27/18 0741  Last data filed at 03/27/18 0717   Gross per 24 hour   Intake                0 ml   Output              700 ml   Net             -700 ml     I/O this shift:  In: -   Out: 300 [Urine:300]      Laboratory Tests:  Lab Results   Component Value Date    CREATININE 1.2 03/27/2018    BUN 35 (H) 03/27/2018     03/27/2018    K 4.2 03/27/2018     03/27/2018    CO2 30 (H) 03/27/2018     Recent Labs      03/26/18   1950  03/27/18   0154  03/27/18   0615   TROPONINI  0.10*  0.08*  0.10*     Lab Results   Component Value Date    PROBNP 2,364 (H) 03/26/2018     Lab Results   Component Value Date    WBC 10.5 03/27/2018    RBC 4.04 03/27/2018    HGB 13.1 03/27/2018    HCT 40.9 03/27/2018    .2 03/27/2018    MCH 32.4 03/27/2018    MCHC 32.0 03/27/2018    RDW 14.9 03/27/2018     03/27/2018    MPV 12.4 03/27/2018     Recent Labs      03/26/18   1950  03/27/18   0615   ALKPHOS  55  72   ALT  30  39   AST  19  33   PROT  6.5  6.4   BILITOT  0.7  0.9   LABALBU  3.8  3.9     Lab Results   Component Value Date    MG 2.7 03/27/2018     Lab Results   Component Value Date    PROTIME 67.5 03/27/2018    PROTIME 15.3 12/05/2011    INR 5.8 03/27/2018     Lab Results   Component Value Date    TSH 2.150 10/14/2012     No components found for: CHLPL  Lab Results   Component Value Date    TRIG 94 03/27/2018    TRIG 93 02/14/2013     Lab Results   Component Value Date    HDL 20 03/27/2018    HDL 19.0 (A) 02/14/2013     Lab Results   Component Value Date    LDLCALC 40 03/27/2018    LDLCALC 38 02/14/2013           Active Hospital Problems    Diagnosis    Chronic atrial fibrillation (HCC) [I48.2]     Priority: High    S/P ICD (internal cardiac defibrillator) procedure [Z95.810]     Priority: High    Ischemic cardiomyopathy [I25.5]     Priority: High    Supratherapeutic INR [R79.1]    ICD (implantable cardioverter-defibrillator) in place [Z95.810]    NSTEMI (non-ST elevated myocardial infarction) [facility] [I21.4]    CAD (coronary artery disease), native coronary artery [I25.10]    Hyperlipidemia LDL goal <100 [E78.5]    Essential hypertension [I10]    Diabetes mellitus type 2, uncontrolled ([facility] [E11.65]    Chronic combined systolic and diastolic CHF (congestive heart failure) ([facility] [I50.42]    Mitral regurgitation [I34.0]    JEANNE (obstructive sleep apnea) [G47.33]             ASSESSMENT / PLAN:  · ? Angina with borderline troponin and 100% paced rhythm. Patient with extensive history of CAD/ischemic cardiomyopathy. Lexiscan stress test pending. Continue Beta blocker, ARB, nitrate, antiplatelet therapy, statin. · Chronic systolic heart failure/ischemic cardiomyopathy. Clinically at baseline.   Continue beta blocker, ARB,

## 2018-03-27 NOTE — DISCHARGE SUMMARY
See H&P. Patient discharged less than 48 hours from admission. Patient presented with chest pain on exertion. Enzymes and EKG negative for acute ischemia. Cardiology evaluated. Stress test demonstrated reduced EF and areas of potentially small ischemic zones. Medical management recommended. No further inpatient intervention planned by cardiology. Discharged to home. Current Discharge Medication List      START taking these medications    Details   isosorbide mononitrate (IMDUR) 120 MG extended release tablet Take 1 tablet by mouth daily  Qty: 30 tablet, Refills: 3         CONTINUE these medications which have CHANGED    Details   carvedilol (COREG) 3.125 MG tablet Take 8 tablets by mouth 2 times daily (with meals)  Qty: 60 tablet, Refills: 0      magnesium (MAGNESIUM-OXIDE) 250 MG TABS tablet Take 1 tablet by mouth 2 times daily  Qty: 60 tablet, Refills: 0         CONTINUE these medications which have NOT CHANGED    Details   melatonin 3 MG TABS tablet Take 9 mg by mouth nightly      acetaminophen (TYLENOL) 325 MG tablet Take 650 mg by mouth every 4 hours as needed for Pain or Fever      Vitamins-Lipotropics (BALANCED B-100 COMPLEX CR PO) Take 1 tablet by mouth daily      candesartan (ATACAND) 16 MG tablet Take 4 mg by mouth every morning (before breakfast)       metFORMIN (GLUCOPHAGE) 500 MG tablet Take 500 mg by mouth 2 times daily (with meals) Patient takes first dose at 6 am.      spironolactone (ALDACTONE) 25 MG tablet Take 25 mg by mouth daily. torsemide (DEMADEX) 20 MG tablet Take 40 mg by mouth every morning (before breakfast)       aspirin 81 MG chewable tablet Take 81 mg by mouth nightly   Qty: 30 tablet, Refills: 0      L-Methylfolate-B6-B12 (METANX PO) Take 1 each by mouth 2 times daily. glimepiride (AMARYL) 4 MG tablet Take 4 mg by mouth every morning (before breakfast). atorvastatin (LIPITOR) 80 MG tablet Take 1 tablet by mouth nightly.   Qty: 30 tablet, Refills: 5

## 2018-03-27 NOTE — H&P
[address] [facility]  History and Physical      CHIEF COMPLAINT:  Chest pain    History of Present Illness: the patient is a 78 y.o. white man followed by DR Homero Gracia who presents secondary to chest pain. He reports a several week history of left sided chest ache with exertion. It is associated with dyspnea and some diaphoresis. No nausea or vomiting. Symptoms are worst when ambulating up 2 flights of stairs. No fevers, chills or productive cough. He has a history of CAD with bypass x 2 and cardiomyopathy with AICD in place. He presented to the ER due to ongoing symptoms. EKG was negative for acute ischemic change but troponin was modestly elevated. Further troponins were not suggestive of acute ischemia. He has been evaluated by cardiology and stress test is being planned. Currently he is awake/alert and in no acute distress.         Past Medical History:   Diagnosis Date    Ascending aortic aneurysm       Atrial fibrillation       CAD (coronary artery disease)     Chronic combined systolic and diastolic CHF (congestive heart failure)       History of MI (myocardial infarction)     Hyperlipidemia     Hypertension     Kidney stones     Mitral regurgitation     Neuropathy       Non-insulin dependent type 2 diabetes mellitus       JEANNE (obstructive sleep apnea)     Pacemaker     Pleural effusion          Past Surgical History:   Procedure Laterality Date    CARDIAC DEFIBRILLATOR PLACEMENT  2007    CARDIAC DEFIBRILLATOR PLACEMENT  2007    dual chamber medtronic initial implant    CARDIAC DEFIBRILLATOR PLACEMENT  2011    ICD gen change    CARDIAC PACEMAKER PLACEMENT  02/23/07    COLONOSCOPY      CORONARY ANGIOPLASTY WITH STENT PLACEMENT  09/02/03, 05/12/09    2 STENTS    CORONARY ANGIOPLASTY WITH STENT PLACEMENT  11/2012    CORONARY ARTERY BYPASS GRAFT  1986    ACB X 5    CORONARY ARTERY BYPASS GRAFT  2000    REDO ACB X 4    ECHO COMPL W DOP COLOR FLOW  10/14/2012         INGUINAL HERNIA REPAIR      LITHOTRIPSY      2 times    OTHER SURGICAL HISTORY  2011    Replacement of Cardiac Defibrillator     OTHER SURGICAL HISTORY  11/2011    Replacement of cardiac pacemaker    WRIST SURGERY  2001    LEFT       Medications Prior to Admission:    Includes diovan, tylenol, glucophage, demadex, aspirin, amaryl, lipitor, coreg, magnesium, coumadin, calcium, plavis and imdur (all dose as per chart). Note that the patient's home medications were reviewed and the above list is accurate to the best of my knowledge at the time of the exam.    Allergies: Ancef [cefazolin sodium]; Bacitracin; Biaxin [clarithromycin]; and Doxycycline    Social History:    reports that he quit smoking about 43 years ago. His smoking use included Cigarettes. He has a 7.50 pack-year smoking history. He has never used smokeless tobacco. He reports that he drinks alcohol. He reports that he does not use drugs. Family History:   family history includes Cancer in his mother; Heart Attack in his mother; Heart Attack (age of onset: 27) in his son; Heart Disease in his father and mother; Prostate Cancer in his father. REVIEW OF SYSTEMS:  As above in the HPI, otherwise negative    PHYSICAL EXAM:    Vitals:  BP (!) 99/59   Pulse 71   Temp 98 °F (36.7 °C) (Oral)   Resp 19   Wt 240 lb (108.9 kg)   SpO2 97%   BMI 34.44 kg/m²     General:  Awake, alert, oriented X 3. Well developed, well nourished, well groomed. No apparent distress. HEENT:  Normocephalic, atraumatic. No scleral icterus. No conjunctival injection. Normal lips, teeth, and gums. No nasal discharge. No oral lesions. Neck:  Supple  Heart:  RRR, no murmurs, gallops, or rubs; no carotid bruits  Lungs:  CTA bilaterally, bilat symmetrical expansion, no wheeze, rales, or rhonchi  Abdomen:   Bowel sounds present, soft, non distended, nontender, no masses, no organomegaly, no peritoneal signs  Extremities:  No clubbing or cyanosis, 1-2+ bilateral lower Diabetes mellitus type 2, uncontrolled         PLAN:    1) admit to monitored bed  2) cycle enzymes  3) cardiology consult (done)  4) for stress test today  5) hold coumadin due to elevated INR and give vitamin K to reverse coagulopathy  6) home later today if cardiac work up negative  7) the patient is expected to stay less than 2 or more midnights to evaluate and treat his chest pain      Please note that over 50 minutes was spent in evaluating the patient, review of records and results, discussion with staff/family, etc.    Andie Kaminski MD  7:42 AM  3/27/2018

## 2018-04-08 PROBLEM — N17.9 AKI (ACUTE KIDNEY INJURY) (HCC): Status: ACTIVE | Noted: 2018-01-01

## 2018-04-08 PROBLEM — R77.8 ELEVATED TROPONIN: Status: ACTIVE | Noted: 2018-01-01

## 2018-04-08 PROBLEM — I95.9 HYPOTENSION: Status: ACTIVE | Noted: 2018-01-01

## 2018-05-08 PROBLEM — R77.8 ELEVATED TROPONIN: Status: RESOLVED | Noted: 2018-01-01 | Resolved: 2018-01-01

## 2018-07-05 PROBLEM — R07.9 CHEST PAIN: Status: ACTIVE | Noted: 2018-01-01

## 2018-07-05 PROBLEM — I50.23 ACUTE ON CHRONIC SYSTOLIC CHF (CONGESTIVE HEART FAILURE) (HCC): Status: ACTIVE | Noted: 2018-01-01

## 2018-07-05 PROBLEM — N17.9 AKI (ACUTE KIDNEY INJURY) (HCC): Status: RESOLVED | Noted: 2018-01-01 | Resolved: 2018-01-01

## 2018-07-05 PROBLEM — S32.10XA CLOSED FRACTURE OF SACRUM (HCC): Status: ACTIVE | Noted: 2018-01-01

## 2018-07-05 NOTE — PROGRESS NOTES
Cardiology consult called to Dr. Saleem Strickland ans service. Awaiting call back. Dr. Saleem Strickland returned call; updated on consult. Labs and medications reviewed. States they will see her tomorrow.

## 2018-07-05 NOTE — CONSULTS
Department of Orthopedic Surgery  Resident Consult Note          CHIEF COMPLAINT:  Tailbone pain    HISTORY OF PRESENT ILLNESS:                The patient is a 78 y.o. male who presents with tailbone pain after a fall yesterday. Patient sitting in bed. Patient is admitted for chest pain. Denies numbness/tingling/paresthesias. Denies pain elsewhere.       Past Medical History:        Diagnosis Date    Ascending aortic aneurysm ([facility])     Atrial fibrillation ([facility])     CAD (coronary artery disease)     Chronic combined systolic and diastolic CHF (congestive heart failure) ([facility])     History of MI (myocardial infarction)     Hyperlipidemia     Hypertension     Kidney stones     Mitral regurgitation     Neuropathy ([address]     Non-insulin dependent type 2 diabetes mellitus ([facility])     JEANNE (obstructive sleep apnea)     Pacemaker     Pleural effusion     Systolic heart failure ([facility] [address] 10/14/2012    10/14/2012-echocardiogram revealed an LVEF of 35-40%     Past Surgical History:        Procedure Laterality Date    CARDIAC DEFIBRILLATOR PLACEMENT  2007    CARDIAC DEFIBRILLATOR PLACEMENT  2007    dual chamber medtronic initial implant    CARDIAC DEFIBRILLATOR PLACEMENT  2011    ICD gen change    CARDIAC PACEMAKER PLACEMENT  02/23/07    COLONOSCOPY      CORONARY ANGIOPLASTY WITH STENT PLACEMENT  09/02/03, 05/12/09    2 STENTS    CORONARY ANGIOPLASTY WITH STENT PLACEMENT  11/2012    CORONARY ARTERY BYPASS GRAFT  1986    ACB X 5    CORONARY ARTERY BYPASS GRAFT  2000    REDO ACB X 4    ECHO COMPL W DOP COLOR FLOW  10/14/2012         INGUINAL HERNIA REPAIR      LITHOTRIPSY      2 times    OTHER SURGICAL HISTORY  2011    Replacement of Cardiac Defibrillator     OTHER SURGICAL HISTORY  11/2011    Replacement of cardiac pacemaker    WRIST SURGERY  2001    LEFT     Current Medications:   Current Facility-Administered Medications: acetaminophen (TYLENOL) tablet 650 mg, 650 mg, Oral, Q4H PRN  aspirin chewable tablet 81 mg, 81 mg, Oral, Nightly  atorvastatin (LIPITOR) tablet 80 mg, 80 mg, Oral, Nightly  carvedilol (COREG) tablet 6.25 mg, 6.25 mg, Oral, BID   [START ON 7/6/2018] furosemide (LASIX) injection 40 mg, 40 mg, Intravenous, Daily  sodium chloride flush 0.9 % injection 10 mL, 10 mL, Intravenous, 2 times per day  sodium chloride flush 0.9 % injection 10 mL, 10 mL, Intravenous, PRN  insulin lispro (HUMALOG) injection vial 0-12 Units, 0-12 Units, Subcutaneous, TID WC  insulin lispro (HUMALOG) injection vial 0-6 Units, 0-6 Units, Subcutaneous, Nightly  glucose (GLUTOSE) 40 % oral gel 15 g, 15 g, Oral, PRN  dextrose 50 % solution 12.5 g, 12.5 g, Intravenous, PRN  glucagon (rDNA) injection 1 mg, 1 mg, Intramuscular, PRN  dextrose 5 % solution, 100 mL/hr, Intravenous, PRN  amiodarone (CORDARONE) tablet 100 mg, 100 mg, Oral, Daily  clindamycin (CLEOCIN) capsule 300 mg, 300 mg, Oral, 4x Daily  [START ON 7/6/2018] glipiZIDE (GLUCOTROL) tablet 5 mg, 5 mg, Oral, QAM AC  spironolactone (ALDACTONE) tablet 12.5 mg, 12.5 mg, Oral, Daily  [START ON 7/6/2018] valsartan (DIOVAN) tablet 20 mg, 20 mg, Oral, Daily  trimethobenzamide (TIGAN) injection 200 mg, 200 mg, Intramuscular, Q6H PRN  Allergies: Ancef [cefazolin sodium]; Bacitracin; Biaxin [clarithromycin]; Doxycycline; and Zyvox [linezolid]    Social History:   TOBACCO:   reports that he quit smoking about 43 years ago. His smoking use included Cigarettes. He has a 7.50 pack-year smoking history. He has never used smokeless tobacco.  ETOH:   reports that he drinks alcohol. DRUGS:   reports that he does not use drugs.   ACTIVITIES OF DAILY LIVING:    OCCUPATION:    Family History:   Family History   Problem Relation Age of Onset    Heart Disease Mother     Heart Attack Mother     Cancer Mother         MELANOMA    Heart Disease Father     Prostate Cancer Father     Heart Attack Son 30       REVIEW OF SYSTEMS:  CONSTITUTIONAL: negative for fevers, chills  EYES: negative for blurred vision, visual disturbance  HEENT: negative for hearing loss, voice change  RESPIRATORY: negative for dyspnea, wheezing  CARDIOVASCULAR: negative for chest pain, palpitations  GASTROINTESTINAL: negative for nausea, vomiting  GENITOURINARY: negative for frequency, urinary incontinence  HEMATOLOGIC/LYMPHATIC: negative for bleeding and petechiae  MUSCULOSKELETAL: pain, decreased range of motion and bone pain  NEUROLOGICAL: negative for headaches, dizziness  BEHAVIOR/PSYCH: negative for increased agitation and anxiety    PHYSICAL EXAM:    VITALS:  /77   Pulse 71   Temp 97.2 °F (36.2 °C) (Temporal)   Resp 18   Ht 5' 9\" (1.753 m)   Wt 225 lb 12.8 oz (102.4 kg)   SpO2 94%   BMI 33.34 kg/m²   CONSTITUTIONAL:  awake, alert, cooperative, no apparent distress, and appears stated age  MUSCULOSKELETAL:  Bilateral lower Extremity:  Pitting edema to bilateral lower extremities, chronic open wound to anterior left tibia no drainage, - erythema  +TTP to tailbone region   Has 2 x 4 cm area of ecchymosis at superior aspect of gluteal cleftCompartments soft and compressible  Palpable dorsalis pedis and posterior tibialis pulse, brisk cap refill to toes, foot warm and perfused  Sensation intact to light touch in sural/deep peroneal/superficial peroneal/saphenous/posterior tibial nerve distributions to foot/ankle  Demonstrates active ankle plantar/dorsiflexion/great toe extension      Secondary Exam:     bilateralUE: -TTP to fingers, hand, wrist, forearm, elbow, humerus, shoulder or clavicle.        DATA:    CBC:   Lab Results   Component Value Date    WBC 12.1 07/05/2018    RBC 4.09 07/05/2018    HGB 13.3 07/05/2018    HCT 40.0 07/05/2018    MCV 97.8 07/05/2018    MCH 32.5 07/05/2018    MCHC 33.3 07/05/2018    RDW 16.4 07/05/2018     07/05/2018    MPV 12.2 07/05/2018     PT/INR:    Lab Results   Component Value Date    PROTIME 35.8 07/05/2018    PROTIME 15.3 12/05/2011    INR 3.2 07/05/2018     Radiology

## 2018-07-05 NOTE — ED PROVIDER NOTES
ED Attending  CC: Kaylin     Department of Emergency Medicine   ED  Provider Note  Admit Date/RoomTime: 7/5/2018 10:24 AM  ED Room: 15/15   Chief Complaint     Chest Pain (pt states that has been having some intermittent chest tightness. pt states that he was recently discharged from [facility] after a 10 day stay for CHF. pt also c/i \"tailbone pain\" from a fall yesterday. pt denies hitting head/loc.)    History of Present Illness   Source of history provided by:  patient. History/Exam Limitations: none. Angie Vergara is a 78 y.o. old male who has a past medical history of:   Past Medical History:   Diagnosis Date    Ascending aortic aneurysm (HCC)     [facility] [address]     CAD (coronary artery disease)     Chronic combined systolic and diastolic CHF (congestive heart failure) (HCC)     History of MI (myocardial infarction)     Hyperlipidemia     Hypertension     Kidney stones     Mitral regurgitation     Neuropathy (HCC)     Non-insulin dependent type 2 diabetes mellitus (HCC)     JEANNE (obstructive sleep apnea)     Pacemaker     Pleural effusion     Systolic heart failure ([address] 10/14/2012    10/14/2012-echocardiogram revealed an LVEF of 35-40%      presents to the emergency department by ambulance where the patient received see Ambulance Run Sheet prior to arrival., with complaints of gradual onset, resolved substernal discomfort described as tightness beginning 2 hour(s) prior to arrival.  The pain does not  radiate to neck, back or abdomen. Duration of symptoms: 30 minutes to an hour. Patient states that he was concerned for a carbon monoxide or some other gas leak in his basement, but when he called EMS the fire department came tested his basement and found no abnormalities. Symptom(s) at onset was mild, now is none and at worst was mild.    His symptoms are associated with sacral pain from a mechanical fall that occurred 1 day prior to arrival.   The symptoms are worsened by nothing and relieved by nothing. There has been No shortness of breath, No dyspnea on exertion, No orthopnea, No paroxysmal nocturnal dyspnea, No edema, No palpitations and No syncope associated with complaint. His cardiac risk factors are advanced age (older than 54 for men, 72 for women), dyslipidemia, hypertension, male gender, obesity (BMI >= 30 kg/m2), sedentary lifestyle and previously diagnosed coronary artery disease. Care prior to arrival consisted of none, with no relief. ROS    Pertinent positives and negatives are stated within HPI, all other systems reviewed and are negative. Past Surgical History:   Procedure Laterality Date    CARDIAC DEFIBRILLATOR PLACEMENT  2007    CARDIAC DEFIBRILLATOR PLACEMENT  2007    dual chamber medtronic initial implant    CARDIAC DEFIBRILLATOR PLACEMENT  2011    ICD gen change    CARDIAC PACEMAKER PLACEMENT  02/23/07    COLONOSCOPY      CORONARY ANGIOPLASTY WITH STENT PLACEMENT  09/02/03, 05/12/09    2 STENTS    CORONARY ANGIOPLASTY WITH STENT PLACEMENT  11/2012    CORONARY ARTERY BYPASS GRAFT  1986    ACB X 5    CORONARY ARTERY BYPASS GRAFT  2000    REDO ACB X 4    ECHO COMPL W DOP COLOR FLOW  10/14/2012         INGUINAL HERNIA REPAIR      LITHOTRIPSY      2 times    OTHER SURGICAL HISTORY  2011    Replacement of Cardiac Defibrillator     OTHER SURGICAL HISTORY  11/2011    Replacement of cardiac pacemaker    WRIST SURGERY  2001    LEFT   Social History:  reports that he quit smoking about 43 years ago. His smoking use included Cigarettes. He has a 7.50 pack-year smoking history. He has never used smokeless tobacco. He reports that he drinks alcohol. He reports that he does not use drugs. Family History: family history includes Cancer in his mother; Heart Attack in his mother; Heart Attack (age of onset: 27) in his son; Heart Disease in his father and mother; Prostate Cancer in his father. Allergies:  Ancef [cefazolin sodium]; 4.5 - 11.5 E9/L    RBC 4.09 3.80 - 5.80 E12/L    Hemoglobin 13.3 12.5 - 16.5 g/dL    Hematocrit 40.0 37.0 - 54.0 %    MCV 97.8 80.0 - 99.9 fL    MCH 32.5 26.0 - 35.0 pg    MCHC 33.3 32.0 - 34.5 %    RDW 16.4 (H) 11.5 - 15.0 fL    Platelets 347 746 - 383 E9/L    MPV 12.2 (H) 7.0 - 12.0 fL    Neutrophils % 82.7 (H) 43.0 - 80.0 %    Immature Granulocytes % 0.5 0.0 - 5.0 %    Lymphocytes % 6.8 (L) 20.0 - 42.0 %    Monocytes % 8.9 2.0 - 12.0 %    Eosinophils % 0.6 0.0 - 6.0 %    Basophils % 0.5 0.0 - 2.0 %    Neutrophils # 10.01 (H) 1.80 - 7.30 E9/L    Immature Granulocytes # 0.06 E9/L    Lymphocytes # 0.82 (L) 1.50 - 4.00 E9/L    Monocytes # 1.08 (H) 0.10 - 0.95 E9/L    Eosinophils # 0.07 0.05 - 0.50 E9/L    Basophils # 0.06 0.00 - 0.20 E9/L   Comprehensive Metabolic Panel   Result Value Ref Range    Sodium 142 132 - 146 mmol/L    Potassium 4.0 3.5 - 5.0 mmol/L    Chloride 102 98 - 107 mmol/L    CO2 28 22 - 29 mmol/L    Anion Gap 12 7 - 16 mmol/L    Glucose 147 (H) 74 - 109 mg/dL    BUN 40 (H) 8 - 23 mg/dL    CREATININE 1.4 (H) 0.7 - 1.2 mg/dL    GFR Non-African American 49 >=60 mL/min/1.73    GFR African American 60     Calcium 9.3 8.6 - 10.2 mg/dL    Total Protein 6.3 (L) 6.4 - 8.3 g/dL    Alb 3.6 3.5 - 5.2 g/dL    Total Bilirubin 1.3 (H) 0.0 - 1.2 mg/dL    Alkaline Phosphatase 91 40 - 129 U/L    ALT 63 (H) 0 - 40 U/L    AST 40 (H) 0 - 39 U/L   Protime-INR   Result Value Ref Range    Protime 35.8 (H) 9.3 - 12.4 sec    INR 3.2    APTT   Result Value Ref Range    aPTT 34.6 24.5 - 35.1 sec   Brain Natriuretic Peptide   Result Value Ref Range    Pro-BNP 4,778 (H) 0 - 450 pg/mL   Magnesium   Result Value Ref Range    Magnesium 2.6 1.6 - 2.6 mg/dL   Lactic Acid, Plasma   Result Value Ref Range    Lactic Acid 1.7 0.5 - 2.2 mmol/L   EKG 12 Lead   Result Value Ref Range    Ventricular Rate 71 BPM    Atrial Rate 72 BPM    QRS Duration 176 ms    Q-T Interval 498 ms    QTc Calculation (Bazett) 541 ms    R Axis -77 degrees    T Axis 87 degrees     Imaging: All Radiology results interpreted by Radiologist unless otherwise noted. XR CHEST PORTABLE   Final Result   Cardiomegaly. Small bilateral pleural effusions. Hazy opacities in the   lower lungs. The findings are suggestive of pulmonary edema. XR SACRUM COCCYX (MIN 2 VIEWS)   Final Result   Findings concerning for fracture of the coccyx the age   cannot be determined on this study              EKG #1:  Interpreted by emergency department physician unless otherwise noted. Time:  1034    Rate: 71  Rhythm: paced. Interpretation: unchanged from previous tracings 4/7/18, reviewed with Dr. Liana Madden. ED Course / Medical Decision Making     Medications   furosemide (LASIX) injection 20 mg (not administered)   aspirin tablet 325 mg (325 mg Oral Given 7/5/18 1147)      Re-Evaluations:  7/5/18      Time: 1245    Patients symptoms show no change. He remains asymptomatic with the exception of mild coccygeal pain. Consultations:             IP CONSULT TO INTERNAL MEDICINE  IP CONSULT TO CARDIOLOGY  IP CONSULT TO SOCIAL WORK  IP CONSULT TO ORTHOPEDIC SURGERY    Procedures:   none    MDM:  Patient presents to the emergency department for chest pain. He will be admitted for further evaluation treatment. Counseling:   I have spoken with the patient and discussed todays results, in addition to providing specific details for the plan of care and counseling regarding the diagnosis and prognosis and are agreeable with the plan. Assessment      1. Chest pain in adult    2. Chronic combined systolic and diastolic congestive heart failure ([address]    3. Pleural effusion    4. Acute pulmonary edema (HCC)    5. Closed fracture of coccyx, initial encounter ([address]    6.  Chronic kidney disease, unspecified CKD stage      This patient's ED course included: a personal history and physicial examination, re-evaluation prior to disposition, cardiac monitoring and continuous pulse oximetry  This patient has remained hemodynamically stable, remained unchanged and been closely monitored during their ED course. Plan   Admit to telemetry. Patient condition is good. New Medications     New Prescriptions    No medications on file     Electronically signed by Reid Dorman PA-C   DD: 7/5/18  **This report was transcribed using voice recognition software. Every effort was made to ensure accuracy; however, inadvertent computerized transcription errors may be present.   END OF PROVIDER NOTE        Reid Dorman PA-C  07/05/18 0072

## 2018-07-06 PROBLEM — S30.0XXA CONTUSION, BUTTOCK, INITIAL ENCOUNTER: Status: ACTIVE | Noted: 2018-01-01

## 2018-07-06 NOTE — FLOWSHEET NOTE
Inpatient Wound Care(initial evaluation) 6420a    Admit Date: 7/5/2018 10:24 AM    Reason for consult:  Multiple areas    Significant history:   Includes  ischemic CMO, EF 18% multiple hospital admissions this year for recurrent CP/CHF, extensive cardiac history; admitted after falling on his tailbone     Wound history:  Admitted with wounds,  Patient follows Dr. Juancarlos Hare in the office    Findings:    07/06/18 1031   Wound 07/05/18 Elbow Left   Date First Assessed/Time First Assessed: 07/05/18 1331   Location: (!) Elbow  Wound Location Orientation: Left  Pre-existing: Yes   Wound Type Wound   Dressing/Treatment Foam   Wound Length (cm) 1.6 cm   Wound Width (cm) 3 cm   Wound Depth (cm)  0.1   Calculated Wound Size (cm^2) (l*w) 4.8 cm^2   Change in Wound Size % (l*w) -344.44   Wound Assessment Fragile   Drainage Amount None   Michelle-wound Assessment Induration   Pink%Wound Bed 50   Red%Wound Bed 50   Wound 07/05/18 Leg Lower; Left   Date First Assessed/Time First Assessed: 07/05/18 1332   Location: Leg  Wound Location Orientation: Lower; Left  Pre-existing: Yes  Photo Taken: No   Wound Type Wound   Wound Venous   Dressing/Treatment Dry dressing;Xeroform   Wound Length (cm) 3 cm   Wound Width (cm) 2 cm   Wound Depth (cm)  0.2   Calculated Wound Size (cm^2) (l*w) 6 cm^2   Wound Assessment Fragile   Drainage Amount Scant   Drainage Description Yellow   Odor None   Michelle-wound Assessment Fragile   Red%Wound Bed 25   Yellow%Wound Bed 75   Wound 07/06/18 Leg Right; Lower   Date First Assessed/Time First Assessed: 07/06/18 1030   Location: Leg  Wound Location Orientation: Right; Lower  Pre-existing: Yes  Photo Taken: No   Wound Type (no defined wounds, fluid filled, dried scabs, leg seeping)   Drainage Amount Small   Drainage Description Yellow   Odor None   Wound 07/06/18 Knee Left   Date First Assessed/Time First Assessed: 07/06/18 1030   Location: Knee  Wound Location Orientation: Left  Pre-existing: Yes   Wound Type Wound Dressing/Treatment Foam   Wound Length (cm) 2 cm   Wound Width (cm) 3 cm   Wound Depth (cm)  0.1   Calculated Wound Size (cm^2) (l*w) 6 cm^2   Wound Assessment Red   Drainage Amount Scant   Drainage Description Serosanguinous   Odor None   Diamante-wound Assessment (abraded)   Non-staged Wound Description Partial thickness   Red%Wound Bed 100      07/06/18 1030   Skin Integrity   Skin Integrity Bruising  (dried scabs)   Location BUE, BLE   Skin Integrity Site 2   Skin Integrity Location 2 (dry flaky skin- arms, torso)   Location 2 (bruising- coccyx, sacrum)   Skin Integrity Site 3   Skin Integrity Location 3 (calloused - right heel)   Location 3 (hemosiderosis staining- BLE)   Skin Integrity Site 4   Skin Integrity Location 4 (redness)   Location 4 inner buttocks, diamante-rectal area   Skin Integrity Site 5   Skin Integrity Location 5 (dry patchy area- right inner buttocks)   Location 5 (dried scabs, skin tears- BUE)   Ambulates in room with contact guard    Plan:  Patient states aloe vesta does not work for home  Requesting med from home- clotrimazole  Recommend foam dressing left knee left elbow  xeroform to BLE  Patient to continue follow up in podiatry office after discharge  Jeremy Pennington 7/6/2018 12:52 PM

## 2018-07-06 NOTE — PROGRESS NOTES
Left a message for Dr. Tanesha Cooper regarding Dr. Lena Diop holding discharge today due to patient complaining of chest pain. Patient started on imdur and will continue to monitor patient over night. Also patient requesting something to help him sleep.      Yousif Dean

## 2018-07-06 NOTE — CONSULTS
0.50     Years: 15.00     Types: Cigarettes     Quit date: 7/21/1974    Smokeless tobacco: Never Used    Alcohol use Yes      Comment: SOCIAL    Drug use: No    Sexual activity: Not on file     Other Topics Concern    Not on file     Social History Narrative    No narrative on file       Allergies: Allergies   Allergen Reactions    Ancef [Cefazolin Sodium]      Flushing     Bacitracin      Eye ointment-made eye red and irritated    Biaxin [Clarithromycin] Other (See Comments)     FLUSHING    Doxycycline     Zyvox [Linezolid]        Home Medications:  Prior to Admission medications    Medication Sig Start Date End Date Taking?  Authorizing Provider   clindamycin (CLEOCIN) 300 MG capsule Take 300 mg by mouth 4 times daily For 10 days 7/3/18 7/12/18 Yes Historical Provider, MD   torsemide (DEMADEX) 20 MG tablet Take 20 mg by mouth 2 times daily   Yes Historical Provider, MD   amiodarone (PACERONE) 100 MG tablet Take 100 mg by mouth daily   Yes Historical Provider, MD   SPIRONOLACTONE PO Take 12.5 mg by mouth daily   Yes Historical Provider, MD   Multiple Vitamins-Minerals (MENS MULTIVITAMIN PLUS) TABS Take 1 tablet by mouth daily   Yes Historical Provider, MD   carvedilol (COREG) 3.125 MG tablet Take 3.125 mg by mouth 2 times daily   Yes Historical Provider, MD   glipiZIDE (GLUCOTROL XL) 5 MG extended release tablet Take 5 mg by mouth daily   Yes Historical Provider, MD   Calcium Carbonate-Vit D-Min (CALCIUM 1200 PO) Take 1 tablet by mouth daily   Yes Historical Provider, MD   Coenzyme Q10 (COQ10 PO) Take 1 tablet by mouth daily   Yes Historical Provider, MD   warfarin (COUMADIN) 5 MG tablet Take 5 mg by mouth daily   Yes Historical Provider, MD   valsartan (DIOVAN) 40 MG tablet Take 20 mg by mouth daily   Yes Historical Provider, MD   doxyLAMINE succinate (UNISOM) 25 MG tablet Take 25 mg by mouth nightly as needed for Sleep   Yes Historical Provider, MD   metFORMIN (GLUCOPHAGE-XR) 500 MG extended release tablet Take 500 mg by mouth 2 times daily   Yes Historical Provider, MD   Magnesium Oxide 250 MG TABS Take 250 mg by mouth 2 times daily   Yes Historical Provider, MD   magnesium (MAGNESIUM-OXIDE) 250 MG TABS tablet Take 1 tablet by mouth 2 times daily 3/27/18  Yes Juan Lucio MD   acetaminophen (TYLENOL) 325 MG tablet Take 975 mg by mouth 3 times daily as needed for Pain or Fever    Yes Historical Provider, MD   Vitamins-Lipotropics (BALANCED B-100 COMPLEX CR PO) Take 1 tablet by mouth daily   Yes Historical Provider, MD   aspirin 81 MG chewable tablet Take 81 mg by mouth nightly  10/30/17  Yes Juan Lucio MD   V-Ztgqmzpuxvgp-M7-B12 (METANX PO) Take 1 each by mouth 2 times daily. Yes Historical Provider, MD   atorvastatin (LIPITOR) 80 MG tablet Take 1 tablet by mouth nightly.  10/17/12  Yes Rambo Fine, DO   fish oil-omega-3 fatty acids 1000 MG capsule Take 1,000 mg by mouth daily 2 tabs daily   Yes Historical Provider, MD   Multiple Vitamin (MVI, CELEBRATE, CHEWABLE TABLET) Take 1 tablet by mouth daily    Historical Provider, MD       Current Medications:  Current Facility-Administered Medications   Medication Dose Route Frequency Provider Last Rate Last Dose    nitroGLYCERIN (NITROSTAT) SL tablet 0.4 mg  0.4 mg Sublingual Q5 Min PRN Juan Lucio MD        acetaminophen (TYLENOL) tablet 650 mg  650 mg Oral Q4H PRN Juan Lucio MD        aspirin chewable tablet 81 mg  81 mg Oral Nightly Juan Lucio MD   81 mg at 07/05/18 2204    atorvastatin (LIPITOR) tablet 80 mg  80 mg Oral Nightly Juan Lucio MD   80 mg at 07/05/18 2204    carvedilol (COREG) tablet 6.25 mg  6.25 mg Oral BID WC Juan Lucio MD   6.25 mg at 07/05/18 1909    furosemide (LASIX) injection 40 mg  40 mg Intravenous Daily Juan Lucio MD        sodium chloride flush 0.9 % injection 10 mL  10 mL Intravenous 2 times per day Juan Lucio MD   10 mL at 07/05/18 2205    sodium chloride flush 0.9 % injection anicteric  Neck: No JVD, no carotid bruits, no thyromegaly, no adenopathy  Cardiac:  Regular rhythm, no S3. Grade 1 systolic murmur at left sternal border. Apical impulse in large and 1+ edema lower extremities bilaterally with chronic stasis changes. Intact pulses in the upper and lower extremities bilaterally  Respiratory: Clear bilaterally; no wheezes, no rales, no rhonchi. Unlabored respirations  Abdomen: Soft, nontender, nondistended, bowel sounds+, no hepatomegaly, no masses, no abdominal bruits  Extremities: 1+ edema, left foot mild cyanosis, no clubbing. Wrapped in gauze  Skin: Intact, warm and dry, no rashes, no breakdown  Musculoskeletal: normal tone and strength in the upper and lower extremities bilaterally  Neuro: No focal deficits. Moves all extremities appropriately to command. Normal sensation in the upper and lower extremities bilaterally  Psychiatric: Cooperative, and normal affect    Intake/Output:    Intake/Output Summary (Last 24 hours) at 07/06/18 0833  Last data filed at 07/06/18 0438   Gross per 24 hour   Intake              640 ml   Output              425 ml   Net              215 ml     No intake/output data recorded.     Laboratory Tests:  Last 3 CBC:  Recent Labs      07/05/18   1100  07/06/18   0559   WBC  12.1*  14.8*   RBC  4.09  4.18   HGB  13.3  13.5   HCT  40.0  41.0   MCV  97.8  98.1   MCH  32.5  32.3   MCHC  33.3  32.9   RDW  16.4*  16.2*   PLT  243  236   MPV  12.2*  12.7*       Last 3 CMP:  Recent Labs      07/05/18   1100  07/06/18   0559   NA  142  139   K  4.0  4.0   CL  102  98   CO2  28  27   BUN  40*  41*   CREATININE  1.4*  1.4*   GLUCOSE  147*  178*   CALCIUM  9.3  9.6   PROT  6.3*   --    LABALBU  3.6   --    BILITOT  1.3*   --    ALKPHOS  91   --    AST  40*   --    ALT  63*   --        Last 3 Mag/Phos:  Recent Labs      07/05/18   1100  07/06/18   0559   MG  2.6  2.7*       Last 3 CK, CKMB, Troponin  Recent Labs      07/05/18   1100  07/05/18   1653  07/05/18 2256   CKTOTAL   --   54  38   CKMB   --   3.8  3.5   TROPONINI  0.03  0.02  0.03       Last 3 Pro-BNP:  Recent Labs      07/05/18   1100   PROBNP  4,778*     Lab Results   Component Value Date    PROBNP 4,778 (H) 07/05/2018       Last 3 Glucose:     Recent Labs      07/05/18   1100  07/06/18   0559   GLUCOSE  147*  178*       Last 3 Coags:  Recent Labs      07/05/18   1100  07/06/18   0559   PROTIME  35.8*  36.2*   INR  3.2  3.2     Lab Results   Component Value Date    PROTIME 36.2 07/06/2018    PROTIME 15.3 12/05/2011    INR 3.2 07/06/2018       Last 3 Lipid Panel:  No results for input(s): LDLCALC, HDL, TRIG in the last 72 hours. Invalid input(s): CHLPL  Lab Results   Component Value Date    LDLCALC 40 03/27/2018    LDLCALC 38 02/14/2013     Lab Results   Component Value Date    HDL 20 03/27/2018    HDL 19.0 (A) 02/14/2013     Lab Results   Component Value Date    TRIG 94 03/27/2018    TRIG 93 02/14/2013     No components found for: CHLPL    TSH:  No results for input(s): TSH in the last 72 hours. Lab Results   Component Value Date    TSH 2.150 10/14/2012       ABGs:  No results for input(s): PH, PO2, PCO2, HCO3, BE, O2SAT in the last 72 hours. Lactic Acid:  Recent Labs      07/05/18   1100   LACTA  1.7         Radiology:  RAD Results:  XR CHEST PORTABLE   Final Result   Cardiomegaly. Small bilateral pleural effusions. Hazy opacities in the   lower lungs. The findings are suggestive of pulmonary edema. XR SACRUM COCCYX (MIN 2 VIEWS)   Final Result   Findings concerning for fracture of the coccyx the age   cannot be determined on this study            CXR: sternotomy, ICD, small effusion- no significant change from baseline      EKG and Telemetry:  12-lead EKG personally reviewed and shows v paced rhythm rate 71    Telemetry personally reviewed and shows paced rhythm        ASSESSMENT / PLAN:    · CAD  borderline troponin and 100% paced rhythm. No ACS.   Patient with extensive history of CAD/ischemic

## 2018-07-06 NOTE — PROGRESS NOTES
Notified Dr Gibran Porter that the pt is c/o of chest pain and is requesting a subinguinal nitro. Pt's /69, HR 71. Orders obtained and dsicharge held. Will continue to monitor.

## 2018-07-06 NOTE — PLAN OF CARE
Problem: Falls - Risk of:  Goal: Will remain free from falls  Will remain free from falls   Outcome: Met This Shift    Goal: Absence of physical injury  Absence of physical injury   Outcome: Met This Shift      Problem: Pain:  Goal: Pain level will decrease  Pain level will decrease   Outcome: Met This Shift

## 2018-07-06 NOTE — CONSULTS
[address]                   [address] [place] [place]                                   CONSULTATION    PATIENT NAME: Ferna Aase                 :        1940  MED REC NO:   [id number]                            ROOM:       6097  ACCOUNT NO:   [de-identified]                           ADMIT DATE: 2018  PROVIDER:     Tamar Morfin MD    CONSULT DATE:  2018REQUESTING PROVIDER:  Jose Ge MD    CHIEF COMPLAINT:  Not much. HISTORY OF PRESENT ILLNESS:  This is a 74-year-old gentleman admitted for  congestive heart failure, who suffered a fall yesterday at home and was  complaining of some buttocks pain. An x-ray had been obtained. He states  he fell because he was trying to do too many things and was moving too  fast.  He states he has a little discomfort, but he is able to sit and lie  down without any particular problem. Radiographs report were read as  suggestive of fracture and that there was some difference compared to  previous studies. The patient states that he was sitting in the chair  earlier today without any particular problem. PHYSICAL EXAMINATION:  GENERAL:  He is comfortably lying in bed. EXTREMITIES:  He is able to straight leg raise both lower extremities. He  has venous _____ of both legs. He is able to flex and extend at his knees,  dorsiflex and plantarflex both feet. He is able to elevate both arms above  shoulder level, flex and extend at the wrist, and flex and extend at the  elbows. There are multiple areas of skin breakdown as well as evidence of  previous venous stasis ulceration on his lower leg.  strength on both  hands is 5/5. BACK:  On examination of his gluteal area, there is an area of ecchymosis  at the superior aspect of the gluteal cleft approximately 2 cm wide x  approximately 4 mm in length.   He does not have any significant discomfort  with palpation in and around the

## 2018-07-06 NOTE — H&P
implant    CARDIAC DEFIBRILLATOR PLACEMENT  2011    ICD gen change    CARDIAC PACEMAKER PLACEMENT  02/23/07    COLONOSCOPY      CORONARY ANGIOPLASTY WITH STENT PLACEMENT  09/02/03, 05/12/09    2 STENTS    CORONARY ANGIOPLASTY WITH STENT PLACEMENT  11/2012    CORONARY ARTERY BYPASS GRAFT  1986    ACB X 5    CORONARY ARTERY BYPASS GRAFT  2000    REDO ACB X 4    ECHO COMPL W DOP COLOR FLOW  10/14/2012         INGUINAL HERNIA REPAIR      LITHOTRIPSY      2 times    OTHER SURGICAL HISTORY  2011    Replacement of Cardiac Defibrillator     OTHER SURGICAL HISTORY  11/2011    Replacement of cardiac pacemaker    WRIST SURGERY  2001    LEFT         Medications Prior to Admission:    Prescriptions Prior to Admission: clindamycin (CLEOCIN) 300 MG capsule, Take 300 mg by mouth 4 times daily For 10 days  torsemide (DEMADEX) 20 MG tablet, Take 20 mg by mouth 2 times daily  amiodarone (PACERONE) 100 MG tablet, Take 100 mg by mouth daily  SPIRONOLACTONE PO, Take 12.5 mg by mouth daily  Multiple Vitamins-Minerals (MENS MULTIVITAMIN PLUS) TABS, Take 1 tablet by mouth daily  carvedilol (COREG) 3.125 MG tablet, Take 3.125 mg by mouth 2 times daily  glipiZIDE (GLUCOTROL XL) 5 MG extended release tablet, Take 5 mg by mouth daily  Calcium Carbonate-Vit D-Min (CALCIUM 1200 PO), Take 1 tablet by mouth daily  Coenzyme Q10 (COQ10 PO), Take 1 tablet by mouth daily  warfarin (COUMADIN) 5 MG tablet, Take 5 mg by mouth daily  valsartan (DIOVAN) 40 MG tablet, Take 20 mg by mouth daily  doxyLAMINE succinate (UNISOM) 25 MG tablet, Take 25 mg by mouth nightly as needed for Sleep  metFORMIN (GLUCOPHAGE-XR) 500 MG extended release tablet, Take 500 mg by mouth 2 times daily  Magnesium Oxide 250 MG TABS, Take 250 mg by mouth 2 times daily  magnesium (MAGNESIUM-OXIDE) 250 MG TABS tablet, Take 1 tablet by mouth 2 times daily  acetaminophen (TYLENOL) 325 MG tablet, Take 975 mg by mouth 3 times daily as needed for Pain or Fever Vitamins-Lipotropics (BALANCED B-100 COMPLEX CR PO), Take 1 tablet by mouth daily  aspirin 81 MG chewable tablet, Take 81 mg by mouth nightly   L-Methylfolate-B6-B12 (METANX PO), Take 1 each by mouth 2 times daily. atorvastatin (LIPITOR) 80 MG tablet, Take 1 tablet by mouth nightly. fish oil-omega-3 fatty acids 1000 MG capsule, Take 1,000 mg by mouth daily 2 tabs daily  Multiple Vitamin (MVI, CELEBRATE, CHEWABLE TABLET), Take 1 tablet by mouth daily    Allergies: Ancef [cefazolin sodium]; Bacitracin; Biaxin [clarithromycin]; Doxycycline; and Zyvox [linezolid]    Social History:   TOBACCO:   reports that he quit smoking about 43 years ago. His smoking use included Cigarettes. He has a 7.50 pack-year smoking history. He has never used smokeless tobacco.  ETOH:   reports that he drinks alcohol. MARITAL STATUS:    OCCUPATION:      Family History:   Family History   Problem Relation Age of Onset    Heart Disease Mother     Heart Attack Mother     Cancer Mother         MELANOMA    Heart Disease Father     Prostate Cancer Father     Heart Attack Son 30       REVIEW OF SYSTEMS:    General ROS: positive for  - fatigue and malaise, pain and tailbone and elevated blood pressure  Hematological and Lymphatic ROS: negative  Endocrine ROS: negative  Respiratory ROS: no cough, shortness of breath, or wheezing  Cardiovascular ROS: no chest pain or dyspnea on exertion  Gastrointestinal ROS: no abdominal pain, change in bowel habits, or black or bloody stools  Genito-Urinary ROS: no dysuria, trouble voiding, or hematuria  Neurological ROS: no TIA or stroke symptoms  negative    Vitals:  BP (!) 100/50 Comment: manual  Pulse 74   Temp 97.6 °F (36.4 °C) (Oral)   Resp 18   Ht 5' 9\" (1.753 m)   Wt 226 lb (102.5 kg)   SpO2 99%   BMI 33.37 kg/m²     PHYSICAL EXAM:  General:  Awake, alert, oriented X 3. Well developed, well nourished, well groomed. No apparent distress. HEENT:  Normocephalic, atraumatic.   Pupils equal,

## 2018-07-06 NOTE — PROGRESS NOTES
OCCUPATIONAL THERAPY INITIAL EVALUATION      Date:2018  Patient Name: Kat Watters  MRN: [id number]  : 1940  Room: [address] [place] [address]-A     Evaluating OT: Mitzi Soulier AM-PAC daily activity score:   G-code:CK  Recommended Adaptive Equipment: ww     Diagnosis: Chest Pain, s/p fall   Past Medical History:   Diagnosis Date    Ascending aortic aneurysm (HCC)     Atrial fibrillation ([facility]     CAD (coronary artery disease)     Chronic combined systolic and diastolic CHF (congestive heart failure) ([facility])     History of MI (myocardial infarction)     Hyperlipidemia     Hypertension     Kidney stones     Mitral regurgitation     Neuropathy (HCC)     Non-insulin dependent type 2 diabetes mellitus (HCC)     JEANNE (obstructive sleep apnea)     Pacemaker     Pleural effusion     Systolic heart failure ([facility] 10/14/2012    10/14/2012-echocardiogram revealed an LVEF of 35-40%       Precautions: contact isolation, falls, WBAT BLE     Home Living: Pt lives with wife and daughter in a 2.5 story home. Pt's bedroom is on first, bath on 2nd. Pt uses urinals on the first floor, but goes upstairs to shower. Tub/shower with bench. 5 steps to enter with 1 rail. Bathroom setup: tub/shower, bench  Equipment owned: tub bench, cane    Prior Level of Function: independent with ADLs independent with IADLs; using cane PRN for ambulation. Driving: yes, but has not driven since March due to illnesses and hospital stays  Occupation:     Pain Level: pt c/o chest pain due to reflux and \"tailbone\" pain - per ortho report Pt with old injury to his coccyx     Cognition: oriented x 3; follows 2 step directions.    Additional Comments: cooperative, pleasant    Sensory:   Hearing: WFL  Vision: WFL    Glasses: yes [x] no [] reading []      UE Assessment:   Strength ROM Additional Info:    RUE   4+/5 WFL good  and good FMC/dexterity noted during ADL tasks     LUE 4+/5 WFL good  and good FMC/dexterity noted during ADL tasks   Sensation: intact  Tone: WFL  Edema: BLEs    Functional Assessment:   Initial Eval Status 7/6/18 Comments   Feeding  independent    Grooming  SBA/setup    Upper Body Dressing Setup/SBA     Lower Body Dressing DEP to zues socks    Bathing MOD A    Toileting  TBA    Bed Mobility  Supine to Sit: NT, pt sitting EOB  Sit to Supine:NT    Functional Transfers Sit to stand: MIN A  Toilet: TBA    Functional Mobility SBA with ww within room        Sit balance: good  Stand balance: SBA  Endurance/Activity tolerance: Fair                              Comments/Treatment: Upon arrival pt sitting EOB with nursing. Pt agreeable to OT evaluation. Pt c/o reflux pain throughout, reporting the pain is limiting his mobility. Pt performed functional tasks as stated above. Pt required cues for walker safety, hand placement during functional transfers. Pt with fair follow through. Patient/family was educated on the OT plan of care. At end of session pt assisted to bedside chair and encouraged to sit up with all devices within reach, all lines and tubes intact. Assessment:  Pt demonstrates decreased safety awareness, endurance, balance that limits this pt's ability to complete ADL tasks and functional transfers safely and independently. Pt will benefit from further continued skilled OT services.      Eval Complexity: low  Profile and History- low  Assessment of Occupational Performance and Identification of Deficits- low  Clinical Decision Making- low    Treatment frequency: PRN     Plan of Care:  ADL retraining []    Equipment needs []   Neuromuscular re-education []  Energy Conservation Techniques []  Functional Transfer training []  Patient and/or Family Education []  Functional Mobility training []   Environmental Modifications []  Cognitive re-training []    Compensatory techniques for ADLs []  Splinting Needs []    Positioning to improve overall function []  Other: []      Rehab Potential:  good    Patient

## 2018-07-06 NOTE — PROGRESS NOTES
[facility]   Department of Pharmacy   Pharmacist Transition of Care Services         Patient Demographics  Name:  Faith Camilo   Medical Record Number:  [id number]  Gender:  male   Age:  78 y.o. YOB: 1940    Address:    [address] [address]  Phone number:  [phone]    Admission date: 7/5/2018  Admission Diagnosis:  Chest pain [R07.9]  Admitting Physician: Maia Gilford, MD    Primary Care Physician: Milagro Pina DO  Primary Care Physician phone number:  [phone]  Outpatient Preferred Pharmacy:   [facility] [address] [place] [address], [address] [place] [address] 35  / [address]  [facility] [address]  Phone: [phone] Fax: [phone]        Readmission Risk (% from EPIC Patient List): 27%       Patient plans to participate in [address] [place] [address] ANNISTON (Y/N): N      Pharmacist Review and Summary of Medication Changes Made During Admission     Date of last reviewed/update: 7/6/18    Sources(s) of medication information (check all that apply):  [x] EPIC - documentation for current admission  [] EPIC - documentation of recent physician office visit, Care Everywhere chart   [x] Patient - interview or personal medication list   [] Patient's family/caretaker/POA/friend   [] Outpatient Pharmacy - phone call   [] Outpatient Pharmacy - medication bottles  [] Physician's office - phone call   [] OARRS Report  [] Nursing home/rehab/assisted living - printed medication list  [] Nursing home/rehab/assisted living - phone call      Category Comments  (Include Pharmacist Initials and Date)   Change in Outpatient Medication  (Dosage Form, Route,   Dose, or Frequency) 1. Carvedilol increased to 6.25 mg BID during admission - home dose of 3.125 mg BID resumed at discharge         New Medication Started 1. Pantoprazole 40 mg daily  2.  Lotrisone cream         Outpatient Medication Discontinued   (or on Hold During Admission) 1.          Other 1. Pharmacist Patient Education:    Pharmacist Education Log:   Date  Person Educated Content of Education and   Printed Materials Provided     (Include Pharmacist Initials)    7/6/18 Patient and significant other Pantoprazole, Lotrisone cream - JR                         If applicable:   []  Unable to be complete education at this time due to: **       []  Barriers to counseling were identified: **    []  Follow-up education is required: **     []  In addition to the above, the patient was provided with the following additional        compliance tools: **     Documentation of Pharmacist Interventions and Follow-up Plan: The following Pharmacist Transition of Care Services were completed during the admission:  []  Entire home medication list was reviewed for accuracy (best possible medication        history was obtained)   [x]  Home medication list was updated or corrected     []  Reviewed and summarized home medication changes and new inpatient         medications    []  Patient education was provided on home medication changes  []  Patient education was provided on new inpatient medications    The following Pharmacist Transition of Care Services were completed as part of the discharge process:  [x]  Reviewed and/or assisted with discharge medication reconciliation  [x]  Discharge patient medication education was provided   [x]  Reviewed the After Visit Summary (AVS) with patient      Additional Interventions:  [x]  Inpatient prescriber was contacted and the following pharmacy recommendations        were accepted: clarification of when to resume warfarin at discharge     [] Outpatient primary care physician was informed of medication changes that were       made during admission. **    [] Population Health Clinical Pharmacist was contacted to arrange post-discharge       review of medications. **     [] Other - see below:  1.             Pharmacist: Vanessa StokesD,

## 2018-07-07 NOTE — PROGRESS NOTES
81 mg Oral Nightly    atorvastatin  80 mg Oral Nightly    carvedilol  6.25 mg Oral BID WC    furosemide  40 mg Intravenous Daily    sodium chloride flush  10 mL Intravenous 2 times per day    insulin lispro  0-12 Units Subcutaneous TID WC    insulin lispro  0-6 Units Subcutaneous Nightly    amiodarone  100 mg Oral Daily    clindamycin  300 mg Oral 4x Daily    glipiZIDE  5 mg Oral QAM AC    spironolactone  12.5 mg Oral Daily    valsartan  20 mg Oral Daily     Continuous Infusions:   dextrose       PRN Meds:.nitroGLYCERIN, mineral oil-hydrophilic petrolatum, acetaminophen, sodium chloride flush, glucose, dextrose, glucagon (rDNA), dextrose, trimethobenzamide    Allergies: Ancef [cefazolin sodium]; Bacitracin; Biaxin [clarithromycin]; Doxycycline; and Zyvox [linezolid]      Labs: Recent Labs      07/05/18   1100  07/06/18   0559   WBC  12.1*  14.8*   HGB  13.3  13.5   HCT  40.0  41.0   MCV  97.8  98.1   PLT  243  236       Labs: Recent Labs      07/05/18 1100 07/06/18   0559   NA  142  139   K  4.0  4.0   CL  102  98   CO2  28  27   BUN  40*  41*   CREATININE  1.4*  1.4*       Labs: Recent Labs      07/05/18   1100  07/05/18   1653  07/05/18   2256   CKTOTAL   --   54  38   CKMB   --   3.8  3.5   TROPONINI  0.03  0.02  0.03       Labs: No results for input(s): BNP in the last 72 hours. Labs: No results for input(s): CHOL, HDL, TRIG in the last 72 hours. Invalid input(s): Jeremiah Kenyon    Labs:   Recent Labs      07/05/18   1100  07/06/18   0559  07/07/18   0515   PROT  6.3*   --    --    INR  3.2  3.2  3.0       Review of systems: No reported significant weight gain or weight loss. no dysuria or frequency, no dizziness, falls or trauma, no change in bowel or bladder habits, no hematochezia, hemoptysis or hematuria. No fevers, chills, nausea or vomiting reported. No significant wheezing or sputum production. No headache or visual changes.   The remainder of the 10 review of systems otherwise

## 2018-07-07 NOTE — PLAN OF CARE
Problem: Falls - Risk of:  Goal: Will remain free from falls  Will remain free from falls   Outcome: Met This Shift    Goal: Absence of physical injury  Absence of physical injury   Outcome: Met This Shift      Problem: Pain:  Goal: Pain level will decrease  Pain level will decrease   Outcome: Met This Shift    Goal: Control of acute pain  Control of acute pain   Outcome: Met This Shift    Goal: Control of chronic pain  Control of chronic pain   Outcome: Met This Shift      Problem: Risk for Impaired Skin Integrity  Goal: Tissue integrity - skin and mucous membranes  Structural intactness and normal physiological function of skin and  mucous membranes.    Outcome: Met This Shift      Problem: Skin Integrity:  Goal: Will show no infection signs and symptoms  Will show no infection signs and symptoms   Outcome: Not Met This Shift  WBC elevated  Goal: Absence of new skin breakdown  Absence of new skin breakdown   Outcome: Met This Shift      Problem: Tissue Perfusion - Cardiopulmonary, Altered:  Goal: Absence of angina  Absence of angina  Outcome: Met This Shift      Problem: Tissue Perfusion - Peripheral, Altered:  Goal: Electrolytes within specified parameters  Electrolytes within specified parameters  Outcome: Met This Shift    Goal: Circulatory function within specified parameters  Circulatory function within specified parameters  Outcome: Met This Shift

## 2018-07-07 NOTE — DISCHARGE SUMMARY
Admitted less than 48 hrs   Negative enzyme  Ok for dc from cards   ppi and imdur added to regimen   Bb dose and demadex  Doses changed

## 2018-08-10 PROBLEM — I63.9 ACUTE CVA (CEREBROVASCULAR ACCIDENT) (HCC): Status: ACTIVE | Noted: 2018-01-01

## 2018-08-10 PROBLEM — S30.0XXA CONTUSION, BUTTOCK, INITIAL ENCOUNTER: Status: RESOLVED | Noted: 2018-01-01 | Resolved: 2018-01-01

## 2018-08-10 PROBLEM — N18.30 CKD (CHRONIC KIDNEY DISEASE) STAGE 3, GFR 30-59 ML/MIN (HCC): Chronic | Status: ACTIVE | Noted: 2018-01-01

## 2018-08-10 PROBLEM — R07.9 CHEST PAIN: Status: RESOLVED | Noted: 2018-01-01 | Resolved: 2018-01-01

## 2018-08-10 PROBLEM — I50.23 ACUTE ON CHRONIC SYSTOLIC CHF (CONGESTIVE HEART FAILURE) (HCC): Status: RESOLVED | Noted: 2018-01-01 | Resolved: 2018-01-01

## 2018-08-10 NOTE — ED PROVIDER NOTES
The patient is a 77-year-old male with a history of CAD, CHF, diabetes, atrial fibrillation on Coumadin, hypertension, and hyperlipidemia who presents to the emergency department accompanied by his wife after being sent in by his primary care provider. He reports that he has been having worsening weakness and fatigue with weakness of his legs over the past 2 weeks. He said multiple hospital admissions in the past few months. He also has extensive cardiac history. The patient had told his primary care provider that he felt like his left leg is weaker and his doctor was concerned that he may have had a stroke. The patient does report that he has had 3 also within the past couple weeks. He denies striking his head or losing consciousness. Associated symptoms are shortness of breath on exertion. He also has chronic swelling of the lower extremities and a chronic left shin ulcer. The patient was observed transferring from wheelchair to the bed upon arrival to the emergency department he required 1 person assistance in order to make this transfer. The history is provided by the patient and the spouse. Fatigue   Severity:  Moderate  Onset quality:  Gradual  Duration:  2 weeks  Timing:  Constant  Progression:  Worsening  Context: not alcohol use, not allergies, not change in medication, not decreased sleep, not dehydration, not drug use, not increased activity, not pinched nerve, not recent infection, not stress and not urinary tract infection    Relieved by:  Rest and lying down  Worsened by:  Standing and activity  Ineffective treatments:  None tried  Associated symptoms: no abdominal pain, no arthralgias, no chest pain, no cough, no diarrhea, no dizziness, no dysuria, no fever, no headaches, no myalgias, no nausea, no seizures, no shortness of breath and no vomiting    Risk factors: congestive heart failure and coronary artery disease        Review of Systems   Constitutional: Positive for fatigue.  Negative for (02/23/07); Coronary angioplasty with stent (09/02/03, 05/12/09); Coronary artery bypass graft (1986); Coronary artery bypass graft (2000); Inguinal hernia repair; Wrist surgery (2001); Lithotripsy; Colonoscopy; other surgical history (2011); other surgical history (11/2011); ECHO Compl W Dop Color Flow (10/14/2012); Coronary angioplasty with stent (11/2012); Cardiac defibrillator placement (2007); and Cardiac defibrillator placement (2011). Social History:  reports that he quit smoking about 44 years ago. His smoking use included Cigarettes. He has a 7.50 pack-year smoking history. He has never used smokeless tobacco. He reports that he drinks alcohol. He reports that he does not use drugs. Family History: family history includes Cancer in his mother; Heart Attack in his mother; Heart Attack (age of onset: 27) in his son; Heart Disease in his father and mother; Prostate Cancer in his father. The patients home medications have been reviewed. Allergies: Ancef [cefazolin sodium]; Bacitracin; Biaxin [clarithromycin];  Doxycycline; and Zyvox [linezolid]    -------------------------------------------------- RESULTS -------------------------------------------------    LABS:  Results for orders placed or performed during the hospital encounter of 08/10/18   CBC Auto Differential   Result Value Ref Range    WBC 10.5 4.5 - 11.5 E9/L    RBC 4.32 3.80 - 5.80 E12/L    Hemoglobin 13.6 12.5 - 16.5 g/dL    Hematocrit 41.5 37.0 - 54.0 %    MCV 96.1 80.0 - 99.9 fL    MCH 31.5 26.0 - 35.0 pg    MCHC 32.8 32.0 - 34.5 %    RDW 15.9 (H) 11.5 - 15.0 fL    Platelets 054 856 - 596 E9/L    MPV 12.4 (H) 7.0 - 12.0 fL    Neutrophils % 83.3 (H) 43.0 - 80.0 %    Immature Granulocytes % 0.6 0.0 - 5.0 %    Lymphocytes % 7.2 (L) 20.0 - 42.0 %    Monocytes % 7.8 2.0 - 12.0 %    Eosinophils % 0.6 0.0 - 6.0 %    Basophils % 0.5 0.0 - 2.0 %    Neutrophils # 8.72 (H) 1.80 - 7.30 E9/L    Immature Granulocytes # 0.06 E9/L    Lymphocytes # 0.75 (L) 1.50 - 4.00 E9/L    Monocytes # 0.81 0.10 - 0.95 E9/L    Eosinophils # 0.06 0.05 - 0.50 E9/L    Basophils # 0.05 0.00 - 0.20 E9/L   Comprehensive Metabolic Panel   Result Value Ref Range    Sodium 141 132 - 146 mmol/L    Potassium 4.6 3.5 - 5.0 mmol/L    Chloride 100 98 - 107 mmol/L    CO2 28 22 - 29 mmol/L    Anion Gap 13 7 - 16 mmol/L    Glucose 138 (H) 74 - 109 mg/dL    BUN 33 (H) 8 - 23 mg/dL    CREATININE 1.5 (H) 0.7 - 1.2 mg/dL    GFR Non-African American 45 >=60 mL/min/1.73    GFR African American 55     Calcium 9.9 8.6 - 10.2 mg/dL    Total Protein 6.3 (L) 6.4 - 8.3 g/dL    Alb 3.4 (L) 3.5 - 5.2 g/dL    Total Bilirubin 1.2 0.0 - 1.2 mg/dL    Alkaline Phosphatase 112 40 - 129 U/L    ALT 44 (H) 0 - 40 U/L    AST 56 (H) 0 - 39 U/L   Troponin   Result Value Ref Range    Troponin 0.01 0.00 - 0.03 ng/mL   Protime-INR   Result Value Ref Range    Protime 26.7 (H) 9.3 - 12.4 sec    INR 2.3    Urinalysis   Result Value Ref Range    Color, UA Yellow Straw/Yellow    Clarity, UA Clear Clear    Glucose, Ur Negative Negative mg/dL    Bilirubin Urine Negative Negative    Ketones, Urine Negative Negative mg/dL    Specific Gravity, UA 1.010 1.005 - 1.030    Blood, Urine TRACE (A) Negative    pH, UA 7.0 5.0 - 9.0    Protein, UA Negative Negative mg/dL    Urobilinogen, Urine 0.2 <2.0 E.U./dL    Nitrite, Urine Negative Negative    Leukocyte Esterase, Urine Negative Negative   Brain Natriuretic Peptide   Result Value Ref Range    Pro-BNP 8,532 (H) 0 - 450 pg/mL   Microscopic Urinalysis   Result Value Ref Range    Casts RARE /LPF    WBC, UA NONE 0 - 5 /HPF    RBC, UA 2-5 0 - 2 /HPF    Bacteria, UA NONE /HPF   EKG 12 Lead   Result Value Ref Range    Ventricular Rate 76 BPM    Atrial Rate 267 BPM    QRS Duration 178 ms    Q-T Interval 506 ms    QTc Calculation (Bazett) 569 ms    R Axis -93 degrees    T Axis 90 degrees       RADIOLOGY:  CT Head WO Contrast   Final Result   1.  No acute intracranial hemorrhage, midline shift, or mass effect. 2. Mild Chronic small vessel ischemic disease. XR CHEST PORTABLE   Final Result   Left pleural effusion and likely associated atelectasis. Left lower   lung infiltrate and airspace consolidation cannot be excluded. Right basilar atelectasis. Possible small right pleural effusion.            ------------------------- NURSING NOTES AND VITALS REVIEWED ---------------------------  Date / Time Roomed:  8/10/2018 12:24 PM  ED Bed Assignment:  13/13    The nursing notes within the ED encounter and vital signs as below have been reviewed. Patient Vitals for the past 24 hrs:   BP Temp Pulse Resp SpO2 Height Weight   08/10/18 1201 108/64 97.8 °F (36.6 °C) 74 17 93 % 5' 9\" (1.753 m) 224 lb 8 oz (101.8 kg)       Oxygen Saturation Interpretation: Normal    ------------------------------------------ PROGRESS NOTES ------------------------------------------    Counseling:  I have spoken with the patient and discussed todays results, in addition to providing specific details for the plan of care and counseling regarding the diagnosis and prognosis. Their questions are answered at this time and they are agreeable with the plan of admission.    --------------------------------- ADDITIONAL PROVIDER NOTES ---------------------------------  Consultations:  Time: 8338. Spoke with Dr. Hallie Landrum. Discussed case. They will admit the patient. This patient's ED course included: a personal history and physicial examination, re-evaluation prior to disposition, multiple bedside re-evaluations, IV medications, cardiac monitoring, continuous pulse oximetry and complex medical decision making and emergency management    This patient has remained hemodynamically stable during their ED course. Diagnosis:  1. Acute on chronic congestive heart failure, unspecified heart failure type ([address]    2. Fatigue, unspecified type    3.  Generalized weakness        Disposition:  Patient's disposition: Admit to

## 2018-08-11 NOTE — PROGRESS NOTES
Pt refusing L leg dressing to be removed and assessed. Pt states he sees podiatry for would under dressing and it is changed every three days. I explained the risk of infection with not having up to date dressing changes completed. Pt agreeable that if not changed by Monday he would allow wound care or physician to assess and change.

## 2018-08-11 NOTE — CONSULTS
Inpatient consult to Neurology  Consult performed by: Prashant Arango ordered by: Zulay Bustamante        Neurology Consult Note    8/11/2018     REASON FOR CONSULTATION: Left leg weakmness     HISTORY OF PRESENT ILLNESS: A 79-year-old man with extensive cardiovascular risk factors including: HTN, HLD, CHF, CAD, a-fib, neuropathy, hx defibrillator, hx CABG, hx stents & angio & hx pacemaker. He does note complain of any subjective weakness but there the family members and related by the PCP has been noted that he is dragging his left leg when walking. This has started since 2 weeks ago and since then it has been stable. No numbness, or loss of consciousness, visual changes, dysphagia, facial numbness, or problem in his speech.           Past Medical History:   Diagnosis Date    Ascending aortic aneurysm (HCC)     Atrial fibrillation (HCC)     CAD (coronary artery disease)     Chronic combined systolic and diastolic CH[facility]congestive heart failure) [facility]     History of MI (myocardial infarction)     Hyperlipidemia     Hypertension     Kidney stones     Mitral regurgitation     Neuropathy     Non-insulin dependent type 2 diabetes mellitus (HCC)     JEANNE (obstructive sleep apnea)     Pacemaker     Pleural effusion     Systolic heart failure ([address] 10/14/2012    10/14/2012-echocardiogram revealed an LVEF of 35-40%        Past Surgical History:   Procedure Laterality Date    CARDIAC DEFIBRILLATOR PLACEMENT  2007    CARDIAC DEFIBRILLATOR PLACEMENT  2007    dual chamber medtronic initial implant    CARDIAC DEFIBRILLATOR PLACEMENT  2011    ICD gen change    CARDIAC PACEMAKER PLACEMENT  02/23/07    COLONOSCOPY      CORONARY ANGIOPLASTY WITH STENT PLACEMENT  09/02/03, 05/12/09    2 STENTS    CORONARY ANGIOPLASTY WITH STENT PLACEMENT  11/2012    CORONARY ARTERY BYPASS GRAFT  1986    ACB X 5    CORONARY ARTERY BYPASS GRAFT  2000    REDO ACB X 4    ECHO COMPL W DOP COLOR FLOW  10/14/2012         INGUINAL HERNIA REPAIR      LITHOTRIPSY      2 times    OTHER SURGICAL HISTORY  2011    Replacement of Cardiac Defibrillator     OTHER SURGICAL HISTORY  11/2011    Replacement of cardiac pacemaker    WRIST SURGERY  2001    LEFT       Prior to Admission medications    Medication Sig Start Date End Date Taking? Authorizing Provider   losartan (COZAAR) 25 MG tablet Take 25 mg by mouth daily   Yes Historical Provider, MD   B Complex-Biotin-FA (BALANCED B COMPLEX) TABS Take 1 tablet by mouth daily   Yes Historical Provider, MD   Coenzyme Q10 (CO Q 10) 10 MG CAPS Take 1 capsule by mouth daily   Yes Historical Provider, MD   calcium carbonate 600 MG TABS tablet Take 2 tablets by mouth daily   Yes Historical Provider, MD   isosorbide mononitrate (IMDUR) 30 MG extended release tablet Take 1 tablet by mouth daily 7/8/18  Yes Jaycee Sandhoff, MD   carvedilol (COREG) 6.25 MG tablet Take 1 tablet by mouth 2 times daily (with meals) 7/7/18  Yes Jaycee Sandhoff, MD   torsemide (DEMADEX) 20 MG tablet Take 2 tablets by mouth daily  Patient taking differently: Take 40 mg by mouth 2 times daily  7/7/18  Yes Jaycee Sandhoff, MD   pantoprazole (PROTONIX) 40 MG tablet Take 1 tablet by mouth every morning (before breakfast) 7/7/18  Yes Jaycee Sandhoff, MD   clotrimazole-betamethasone (LOTRISONE) 1-0.05 % cream Apply topically 2 times daily. 7/6/18  Yes Jaycee Sandhoff, MD   nitroGLYCERIN (NITROSTAT) 0.4 MG SL tablet Place 0.4 mg under the tongue every 5 minutes as needed for Chest pain up to max of 3 total doses. If no relief after 1 dose, call 911.    Yes Historical Provider, MD   amiodarone (PACERONE) 100 MG tablet Take 100 mg by mouth daily   Yes Historical Provider, MD   spironolactone (ALDACTONE) 25 MG tablet Take 12.5 mg by mouth daily   Yes Historical Provider, MD   Multiple Vitamins-Minerals (MENS MULTIVITAMIN PLUS) TABS Take 1 tablet by mouth daily   Yes Historical Provider, MD   glipiZIDE (GLUCOTROL XL) 5 MG extended release tablet Take 5 mg by mouth bleeding. MUSCULOSKELETAL: No swelling. PSYCHIATRIC:  No change in personality, affect or depression. EXAMINATION:  BP (!) 102/56   Pulse 68   Temp 98.2 °F (36.8 °C) (Temporal)   Resp 18   Ht 5' 9\" (1.753 m)   Wt 224 lb 8 oz (101.8 kg)   SpO2 94%   BMI 33.15 kg/m²   GEN: NAD    AAOx3, follows commands, no aphasia/dysarthria. PERRL, EOMI, VFF, normal saccades and pursuit, no gaze preference/nystagmus. Intact facial sensation, left central facial palsy. Intact hearing bilaterally. Tongue midline. Symmetric palate elevation. Neck muscles and shoulder shrug 5/5. Sensation: intact all over (PP, LT, pain, vibration and proprioception), no neglect. Motor: Drift in left upper and lower extremities  DTRs: +1 biceps/triceps/BR/Knees, 0 ankles, plantars down B/L. Coordination: intact F-N and H-S B/L. No dysmetria. Intact BLAS.  Gait: was not tested        ASSESSMENT:  Left sided sensory weakness in face arm and leg started 2 weeks ago and has not been progressive. Patient is already on aspirin and warfarin. He is INR is 2.1, in therapeutic range. CT scan of the head deep note show any significant acute intracranial disease. Patient cannot have MRI because he has a pacemaker. Although we noted that patient had this stroke probably the nature of this his stroke has been a lacunar one as the deficits are subtle and affected all left side equally. LDL is 63.  Already on Lipitor  HbA1C 7.1       PLAN:  - Continue ASA 81 mg  - Continue Coumadin  - Doppler US of carotids, as this is the already investigation that can change the management of this patient    - Better controlling the DM        Electronically signed by Jeremias Thomas MD on 8/11/2018 at 6:27 PM

## 2018-08-11 NOTE — CONSULTS
Telemetry:  12-lead EKG personally reviewed and shows v paced rhythm    Telemetry personally reviewed and shows vpaced rhythm        ASSESSMENT / PLAN:  - Left sided weakness, but no acute finding on CT - echo pending. Already on warfain  ·  CAD   No ACS.  Patient with extensive history of CAD/ischemic cardiomyopathy.  Lexiscan stress test and interventionalist recommendations reviewed from last admission.  Continue Beta blocker,nitrate, antiplatelet therapy, statin. Can try protonix for post prandial pain see if this helps. No further cardiac testing planned at this time.     · Chronic systolic heart failure/ischemic cardiomyopathy.  Clinically at baseline or mildly overloaded.  Continue beta blocker, diuretics/Aldactone,valsartan- will give 3 more doses IV lasix, the n back to demadex.     · Chronic atrial fibrillation with controlled ventricular rate.  Supratherapeutic INR, without bleeding.  Hold coumadin til INR < 3.     · Stable valvular heart disease.  Continue same management as above  · DM per PCP  · ICD- on amio for suppression of VT             Thank you for consultation.     Bing Jerry MD, [address] at [facility]    Electronically signed by Bing Jerry MD on 8/11/2018 at 10:02 AM

## 2018-08-11 NOTE — H&P
Department of Internal Medicine  MD Dain Valladares  [id number]  1940  CHIEF COMPLAINT:  Acute CVA (cerebrovascular accident) [facility])   History Obtained From:  patient, electronic medical record  HISTORY OF PRESENT ILLNESS:    The patient is a 70 y.o. male who presents with left leg dragging and slurred speech. He was at his PCP office and was told to go to ER. He does not have slurred speech now. He says it comes and goes. No other weakness. No chest pain or palpitations.   No fever  Past Medical History:        Diagnosis Date    Ascending aortic aneurysm (HCC)     Atrial fibrillation (HCC)     CAD (coronary artery disease)     Chronic combined systolic and diastolic CHF (congestive heart failure) ([address]     History of MI (myocardial infarction)     Hyperlipidemia     Hypertension     Kidney stones     Mitral regurgitation     Neuropathy     Non-insulin dependent type 2 diabetes mellitus ([address]     JEANNE (obstructive sleep apnea)     Pacemaker     Pleural effusion     Systolic heart failure ([address] 10/14/2012    10/14/2012-echocardiogram revealed an LVEF of 35-40%     Past Surgical History:        Procedure Laterality Date    CARDIAC DEFIBRILLATOR PLACEMENT  2007    CARDIAC DEFIBRILLATOR PLACEMENT  2007    dual chamber medtronic initial implant    CARDIAC DEFIBRILLATOR PLACEMENT  2011    ICD gen change    CARDIAC PACEMAKER PLACEMENT  02/23/07    COLONOSCOPY      CORONARY ANGIOPLASTY WITH STENT PLACEMENT  09/02/03, 05/12/09    2 STENTS    CORONARY ANGIOPLASTY WITH STENT PLACEMENT  11/2012    CORONARY ARTERY BYPASS GRAFT  1986    ACB X 5    CORONARY ARTERY BYPASS GRAFT  2000    REDO ACB X 4    ECHO COMPL W DOP COLOR FLOW  10/14/2012         INGUINAL HERNIA REPAIR      LITHOTRIPSY      2 times    OTHER SURGICAL HISTORY  2011    Replacement of Cardiac Defibrillator     OTHER SURGICAL HISTORY  11/2011    Replacement of cardiac pacemaker    WRIST SURGERY  2001    LEFT daily   doxyLAMINE succinate (UNISOM) 25 MG tablet, Take 25 mg by mouth nightly as needed for Sleep  Current Medications:     furosemide  40 mg Intravenous BID    amiodarone  100 mg Oral Daily    aspirin  81 mg Oral Nightly    atorvastatin  80 mg Oral Nightly    calcium elemental  2 tablet Oral Daily    carvedilol  6.25 mg Oral BID WC    glipiZIDE  5 mg Oral QAM AC    isosorbide mononitrate  30 mg Oral Daily    losartan  25 mg Oral Daily    Magnesium Gluconate  250 mg Oral BID    metFORMIN  500 mg Oral BID WC    pantoprazole  40 mg Oral QAM AC    spironolactone  12.5 mg Oral Daily    torsemide  40 mg Oral BID    sodium chloride flush  10 mL Intravenous 2 times per day    insulin lispro  0-6 Units Subcutaneous TID WC    insulin lispro  0-3 Units Subcutaneous Nightly    mineral oil-hydrophilic petrolatum   Topical BID      Allergies: Ancef [cefazolin sodium]; Bacitracin; Biaxin [clarithromycin]; Zyvox [linezolid]; and Doxycycline  Social History:    Social History     Social History    Marital status:      Spouse name: N/A    Number of children: N/A    Years of education: N/A     Social History Main Topics    Smoking status: Former Smoker     Packs/day: 0.50     Years: 15.00     Types: Cigarettes     Quit date: 7/21/1974    Smokeless tobacco: Never Used    Alcohol use Yes      Comment: SOCIAL    Drug use: No    Sexual activity: Not Asked     Other Topics Concern    None     Social History Narrative    None      Family History:   Family History   Problem Relation Age of Onset    Heart Disease Mother     Heart Attack Mother     Cancer Mother         MELANOMA    Heart Disease Father     Prostate Cancer Father     Heart Attack Son 30     REVIEW OF SYSTEMS:    Constitutional: Negative . HENT: Negative     Eyes: Negative    Respiratory: Negative for cough, hemoptysis and shortness of breath. Cardiovascular: Negative for chest pain and palpitations.    Gastrointestinal: Negative for heartburn, nausea and vomiting. Genitourinary: Negative for dysuria, frequency, hematuria and urgency. Musculoskeletal:    Neurological: Leg dragging and speech  BEHAVIOR/PSYCH: negative    PHYSICAL EXAM:    Vitals:    BP (!) 104/56   Pulse 71   Temp 98.1 °F (36.7 °C) (Temporal)   Resp 18   Ht 5' 9\" (1.753 m)   Wt 224 lb 8 oz (101.8 kg)   SpO2 98%   BMI 33.15 kg/m²   Patient Vitals for the past 24 hrs:   BP Temp Temp src Pulse Resp SpO2   08/11/18 1240 (!) 104/56 98.1 °F (36.7 °C) Temporal 71 18 -   08/11/18 1007 (!) 142/68 98.2 °F (36.8 °C) Temporal 67 18 98 %   08/11/18 0430 100/65 98.1 °F (36.7 °C) Temporal 69 18 -   08/10/18 2330 114/70 97.4 °F (36.3 °C) Oral 70 14 94 %   08/10/18 1801 106/75 97.5 °F (36.4 °C) Temporal 68 18 97 %   08/10/18 1700 111/70 97.8 °F (36.6 °C) Oral 68 14 97 %     CONSTITUTIONAL:  awake, alert, not in apparent acute distress. EYES:  No pallor or icterus. ENT:  Normocephalic, without obvious abnormality, atraumatic, tongue moist  NECK:  supple. JVD not appreciated  LUNGS:  No increased work of breathing, clear to auscultation bilaterally  CHEST: unremarkable  CARDIOVASCULAR:  S1 S2 regular rate and rhythm  ABDOMEN:  Normal bowel sounds, soft, non-tender, no masses palpable  MUSCULOSKELETAL:  Moves all extremeties equally bilaterally. NEUROLOGIC:  Awake, alert and oriented. No gross focal deficit noted.   SKIN:  bruisng  EXTREMETIES:+edema  Rectal: deferred  Genitalia:  deferred  DATA:    Recent Labs      08/10/18   1222  08/11/18   0455   WBC  10.5  11.0   HGB  13.6  12.8   PLT  212  201     Recent Labs      08/10/18   1222  08/11/18   0455   NA  141  142   K  4.6  3.7   CL  100  99   CO2  28  28   BUN  33*  30*   CREATININE  1.5*  1.4*   GLUCOSE  138*  104     Recent Labs      08/10/18   1222  08/11/18   0455   AST  56*  27   ALT  44*  34   ALKPHOS  112  109   BILITOT  1.2  1.1     Recent Labs      08/10/18   1222   TROPONINI  0.01     No results for partial silhouetting of the left hemidiaphragm, suggestive of a left pleural effusion with associated atelectasis. Airspace consolidation in the left lower lung cannot be excluded. Hazy opacities in the right lung base. Small right pleural effusion cannot be excluded. Left pleural effusion and likely associated atelectasis. Left lower lung infiltrate and airspace consolidation cannot be excluded. Right basilar atelectasis. Possible small right pleural effusion.       Medications     dextrose        furosemide  40 mg Intravenous BID    amiodarone  100 mg Oral Daily    aspirin  81 mg Oral Nightly    atorvastatin  80 mg Oral Nightly    calcium elemental  2 tablet Oral Daily    carvedilol  6.25 mg Oral BID WC    glipiZIDE  5 mg Oral QAM AC    isosorbide mononitrate  30 mg Oral Daily    losartan  25 mg Oral Daily    Magnesium Gluconate  250 mg Oral BID    metFORMIN  500 mg Oral BID WC    pantoprazole  40 mg Oral QAM AC    spironolactone  12.5 mg Oral Daily    torsemide  40 mg Oral BID    sodium chloride flush  10 mL Intravenous 2 times per day    insulin lispro  0-6 Units Subcutaneous TID WC    insulin lispro  0-3 Units Subcutaneous Nightly    mineral oil-hydrophilic petrolatum   Topical BID      DIET CARB CONTROL;    ASSESSMENT   Acute C erebrovascular accident )   Chronic combined systolic and diastolic C ongestive heart failure    Chronic atrial fibrillation (HCC)   Diabetes mellitus type 2, uncontrolled (HCC)   Essential hypertension   Hyperlipidemia LDL goal <100 CKD (chronic kidney disease) stage 3, GFR 30-59 ml/min   CAD (coronary artery disease), native coronary artery   ICD (implantable cardioverter-defibrillator) in place   JEANNE (obstructive sleep apnea)   Mitral regurgitation         PLAN/RECOMMENDATIONS:    Cardio and   DVT  Prophylaxis on Warfarin        Electronically signed by Kimberly Pelayo MD on 8/11/2018 at 2:02 PM

## 2018-08-12 NOTE — PROGRESS NOTES
[facility] [address]    Name: Guy Guaman    Age: 70 y.o. PCP: Linda Villa DO    Date of Admission: 8/10/2018 12:24 PM    Date of Service: 8/12/2018    Chief Complaint: Follow-up for recent CVA  The patient is a 70y.o. year old male sent to hospital by PCP for left sided weakness for the past 7-10 days. Denies any new CP, change in weight, palpitations, ICD discharge. Has chronic orthopnea and COLIN. Weight stable.     History of multivessel CAD status post CABG 1986 and redo CABG in 2000 known to have chronically occluded native circulation and fully dependent on 5 patent bypass grafts. he also has permanent atrial fibrillation and has a medtronic ICD (Dr. Prashant Regalado) for chronic ischemic cardiomyopathy with EF about 20-30%. Last catheterization 2015 in setting of small non-STEMI showed all 5 bypass grafts patent including small radial artery graft to small LAD, SVG to RPDA with patent prior SANAM, degenerate but patent SVG to OM, patent SVG to circumflex posterolateral had high-grade distal anastomotic stenosis which was stented with 2.5 x 24 mm Promus SANAM, SVG Y graft was patent to diagonal branch.  In 2012 he had a 4.5 x 22 mm resolute SANAM to SVG to RPDA. Last echo 2015 at time of non-STEMI showed severely dilated LV at 7.4 cm with EF 20%, stage III to stop dysfunction, dilated RV with moderate dysfunction, severe biatrial enlargement, at least moderate MR/TR. Samina Christianson 2018 had a lexiscan SPECT which showed LVEF 18% with fixed anterior defect/infarction with small area of mild inferior/ inferolateral ischemia. Dr Cher Carson recommended conservative management, not much to add interventionally. Interim History:  No new overnight cardiac complaints. Currently with no complaints of CP, SOB, palpitations, dizziness, or lightheadedness. Diuresed 1.7 liters, ambulating to BR without issue. Says eats 50% of his sophie.  No change in left sided weakness, but says not bothering him. Had caroitid US, noted. Echo pending. Telemetry personally reviewed and showed afib, V pacing in the 70\"s    Review of Systems:   Cardiac: As per HPI  General: No fever, chills  Pulmonary: No cough, wheeze, or shortness of breath  GI: No nausea, vomiting,or abdominal pain  Neuro: No headache or confusion    Problem List:  Principal Problem:    Acute [facility]A (cerebrovascular [facility]) ([facility]  Active Problems:    Chronic atrial fibrillation (HCC)    Mitral regurgitation    JEANNE (obstructive sleep apnea)    Chronic combined systolic and diastolic CHF (congestive heart failure) ([facility])    CAD (coronary artery disease), native coronary artery    Hyperlipidemia LDL goal <100    Essential hypertension    Diabetes mellitus type 2, uncontrolled ([facility]    ICD (implantable cardioverter-defibrillator) in place    CKD (chronic kidney disease) stage 3, GFR 30-59 ml/min    Acute on chronic congestive heart failure ([facility])  Resolved Problems:    * No resolved hospital problems. *      Past Medical History:  Past Medical History:   Diagnosis Date    Ascending aortic aneurysm ([facility])     Atrial fibrillation ([facility])     CAD (coronary artery disease)     Chronic combined systolic and diastolic CHF (congestive heart failure) ([facility])     History of MI (myocardial infarction)     Hyperlipidemia     Hypertension     Kidney stones     Mitral regurgitation     Neuropathy     Non-insulin dependent type 2 diabetes mellitus (HCC)     JEANNE (obstructive sleep apnea)     Pacemaker     Pleural effusion     Systolic heart failure ([facility] 10/14/2012    10/14/2012-echocardiogram revealed an LVEF of 35-40%       Allergies:   Allergies   Allergen Reactions    Ancef [Cefazolin Sodium]      Flushing     Bacitracin      Eye ointment-made eye red and irritated    Biaxin [Clarithromycin] Other (See Comments)     FLUSHING    Zyvox [Linezolid]     Doxycycline      Has to take with food; upset stomach         Current Medications:  Current Facility-Administered Medications   Medication Dose Route Frequency Provider Last Rate Last Dose    furosemide (LASIX) injection 40 mg  40 mg Intravenous BID Preeti Johnson MD   40 mg at 08/11/18 1706    nystatin (MYCOSTATIN) ointment   Topical BID Dayan Ruiz MD        warfarin (COUMADIN) daily dosing (placeholder)   Other RX Placeholder Dayan Ruiz MD        amiodarone (CORDARONE) tablet 100 mg  100 mg Oral Daily Dewayne Hebert MD   100 mg at 08/11/18 1010    aspirin chewable tablet 81 mg  81 mg Oral Nightly Dewayne Hebert MD   81 mg at 08/11/18 2200    atorvastatin (LIPITOR) tablet 80 mg  80 mg Oral Nightly Dewayne Hebert MD   80 mg at 08/11/18 2158    calcium elemental (OSCAL) tablet 1,000 mg  2 tablet Oral Daily Dewayne Hebert MD   1,000 mg at 08/11/18 1008    carvedilol (COREG) tablet 6.25 mg  6.25 mg Oral BID  Dewayne Hebert MD   Stopped at 08/12/18 0801    glipiZIDE (GLUCOTROL) tablet 5 mg  5 mg Oral QAM  Dewayne Hebert MD   5 mg at 08/12/18 6892    isosorbide mononitrate (IMDUR) extended release tablet 30 mg  30 mg Oral Daily Dewayne Hebert MD   30 mg at 08/11/18 1008    losartan (COZAAR) tablet 25 mg  25 mg Oral Daily Dewayne Hebert MD   25 mg at 08/11/18 1009    magnesium gluconate (MAGONATE) tablet 250 mg  250 mg Oral BID Dewayne Hebert MD   250 mg at 08/11/18 2159    metFORMIN (GLUCOPHAGE-XR) extended release tablet 500 mg  500 mg Oral BID  Dewayne Hebert MD   500 mg at 08/12/18 0801    pantoprazole (PROTONIX) tablet 40 mg  40 mg Oral QAM ERNA Hebert MD   40 mg at 08/12/18 5624    spironolactone (ALDACTONE) tablet 12.5 mg  12.5 mg Oral Daily Dewayne Hebert MD   12.5 mg at 08/11/18 1007    torsemide (DEMADEX) tablet 40 mg  40 mg Oral BID Dewyane Hebert MD   Stopped at 08/12/18 3704    sodium chloride flush 0.9 % injection 10 mL  10 mL Intravenous 2 times per day Dewayne Hebert MD   10 mL at 08/11/18 2019    sodium chloride flush 0.9 % injection 10 mL  10 mL Intravenous PRN Tania Mao MD        magnesium hydroxide (MILK OF MAGNESIA) 400 MG/5ML suspension 30 mL  30 mL Oral Daily PRN Tania Mao MD   30 mL at 08/11/18 1007    glucose (GLUTOSE) 40 % oral gel 15 g  15 g Oral PRN Tania Mao MD        dextrose 50 % solution 12.5 g  12.5 g Intravenous PRN Tania Mao MD        glucagon (rDNA) injection 1 mg  1 mg Intramuscular PRN Tania Mao MD        dextrose 5 % solution  100 mL/hr Intravenous PRN Tania Mao MD        insulin lispro (HUMALOG) injection vial 0-6 Units  0-6 Units Subcutaneous TID WC Tania Mao MD   1 Units at 08/12/18 0801    insulin lispro (HUMALOG) injection vial 0-3 Units  0-3 Units Subcutaneous Nightly Tania Mao MD        perflutren lipid microspheres (DEFINITY) injection 1.65 mg  1.5 mL Intravenous ONCE PRN Marcellus Gilman MD        mineral oil-hydrophilic petrolatum (HYDROPHOR) ointment   Topical BID Tania Mao MD        mineral oil-hydrophilic petrolatum (HYDROPHOR) ointment   Topical PRN Tania Mao MD          dextrose         Physical Exam:  BP (!) 89/39   Pulse 75   Temp 97.6 °F (36.4 °C) (Temporal)   Resp 16   Ht 5' 9\" (1.753 m)   Wt 213 lb (96.6 kg)   SpO2 97%   BMI 31.45 kg/m²   Weight change: -11 lb 8 oz (-5.216 kg)  Wt Readings from Last 3 Encounters:   08/12/18 213 lb (96.6 kg)   07/07/18 224 lb 14.4 oz (102 kg)   04/15/18 257 lb (116.6 kg)     General: Awake, alert, oriented x3,up in chair, no acute distress  Neck: No JVD, carotid bruits, thyromegaly, or lymphadenopathy  Cardiac:regular normal S1 and split S2, no murmurs, no S3 or S4, no pericardial rubs.   Carotid upstrokes brisk  Resp: clear bilaterally without wheezes, rhonchi, or rales; unlabored respirations  Abdomen: soft, nontender, nondistended, BS+; no masses, bruits, or hepatomegaly  Extremities: no cyanosis, clubbing, slight edema left leg wrapped  Skin: Warm and dry, no rashes or lesions  Neuro: slight left facial droop  Intake/Output:    Intake/Output Summary (Last 24 hours) at 08/12/18 0830  Last data filed at 08/12/18 0611   Gross per 24 hour   Intake              120 ml   Output             1850 ml   Net            -1730 ml     No intake/output data recorded. Laboratory Tests:  Last 3 CBC:  Recent Labs      08/10/18   1222  08/11/18   0455   WBC  10.5  11.0   RBC  4.32  4.04   HGB  13.6  12.8   HCT  41.5  38.4   MCV  96.1  95.0   MCH  31.5  31.7   MCHC  32.8  33.3   RDW  15.9*  15.9*   PLT  212  201   MPV  12.4*  12.6*       Last 3 CMP:  Recent Labs      08/10/18   1222  08/11/18   0455   NA  141  142   K  4.6  3.7   CL  100  99   CO2  28  28   BUN  33*  30*   CREATININE  1.5*  1.4*   GLUCOSE  138*  104   CALCIUM  9.9  9.4   PROT  6.3*  5.9*   LABALBU  3.4*  3.1*   BILITOT  1.2  1.1   ALKPHOS  112  109   AST  56*  27   ALT  44*  34       Last 3 Mag/Phos:  Recent Labs      08/11/18   0455   MG  2.1       Last 3 CK, CKMB, Troponin  Recent Labs      08/10/18   1222   TROPONINI  0.01       Last 3 BNP:  No results for input(s): BNP in the last 72 hours.   Lab Results   Component Value Date     (H) 02/14/2013       Last 3 Glucose:     Recent Labs      08/10/18   1222  08/11/18   0455   GLUCOSE  138*  104       Last 3 Coags:  Recent Labs      08/10/18   1222  08/11/18   0455   PROTIME  26.7*  23.5*   INR  2.3  2.1     Lab Results   Component Value Date    PROTIME 23.5 08/11/2018    PROTIME 15.3 12/05/2011    INR 2.1 08/11/2018       Last 3 Lipid Panel:  Recent Labs      08/11/18   0455   LDLCALC  63   HDL  23   TRIG  87     Lab Results   Component Value Date    LDLCALC 63 08/11/2018    LDLCALC 40 03/27/2018    LDLCALC 38 02/14/2013     Lab Results   Component Value Date    HDL 23 08/11/2018    HDL 20 03/27/2018    HDL 19.0 (A) 02/14/2013     Lab Results   Component Value Date    TRIG 87 08/11/2018    TRIG 94 03/27/2018    TRIG 93 02/14/2013     No components found for: CHLPL    TSH:  No results for input(s): TSH in the last 72 hours. Lab Results   Component Value Date    TSH 2.150 10/14/2012           Radiology:  US CAROTID ARTERY BILATERAL   Final Result   Bilateral atherosclerotic changes in mild to moderate   degree. No indication for hemodynamic stenosis in both sides. CT Head WO Contrast   Final Result   1. No acute intracranial hemorrhage, midline shift, or mass effect. 2. Mild Chronic small vessel ischemic disease. XR CHEST PORTABLE   Final Result   Left pleural effusion and likely associated atelectasis. Left lower   lung infiltrate and airspace consolidation cannot be excluded. Right basilar atelectasis. Possible small right pleural effusion. ASSESSMENT / PLAN:          Neuro-  Left sided weakness, but no acute finding on CT, carotid US no high grade stenosis - echo pending. Already on warfain/asa  ·             CAD   No ACS.  Patient with extensive history of CAD/ischemic cardiomyopathy.  Lexiscan stress test and interventionalist recommendations reviewed from last admission.  Continue Beta blocker,nitrate, antiplatelet therapy, statin.  No further cardiac testing planned at this time.     · Chronic systolic heart failure/ischemic cardiomyopathy.  Clinically at baseline or mildly overloaded.  Continue beta blocker, diuretics/Aldactone,valsartan- can go back to demadex.           Chronic atrial fibrillation with controlled ventricular rate.  therapeutic INR, without bleeding.       · Stable valvular heart disease.  Continue same management as above  · DM per PCP  · ICD- on amio for suppression of VT          Alvaro Berumen MD, Nori Parrish at [facility]    Electronically signed by Alvaro Berumen MD on 8/12/2018 at 8:30 AM

## 2018-08-12 NOTE — PROGRESS NOTES
Goals   Was pt agreeable to Eval/treatment? yes     Does pt have pain? Tailbone from recent fall (2-3 weeks ago)     Bed Mobility  Rolling: NT  Supine to sit: NT  Sit to supine: NT  Scooting: NT  Supervision/independent   Transfers Sit to stand: SBA  Stand to sit: SBA  Stand pivot: SBA  Supervision/independent   Ambulation    80 feet with w/w with Min A. Left lateral lean at times. 150+ feet with w/w with SBA   Stair negotiation: ascended and descended NT   4 steps with 1 rail with SBA   ROM WFL     Strength grossly 4/5  Increase LE strength by 1/2 mm grade   AM-PAC raw score 17/24       Pt is alert and Oriented x3  Balance: standing with w/w Min A. Sensation: intact  Endurance: fair    Chair alarm: no     ASSESSMENT  Pt displays functional ability as noted in the objective portion of this evaluation. Comments/Treatment:  Pt found sitting up in chair. Cueing required for hand placement during tranfers. Pt with unsteady gait and left lateral lean at times. No LOB. At end of eval, pt left sitting up in chair with call light in reach. Examination of body systems Decreased   Functional mobility x   ROM    Strength x   Safety Awareness    Cognition    Endurance x   Sensation    Balance x   Vision/Visual Deficets    Coordination      Patient education  Pt educated on PT objectives during eval, hand placement during tranfers, safety during ambulation. Patient response to education:   Pt verbalized understanding Pt demonstrated skill Pt requires further education in this area   Yes    yes     Rehab potential is Good for reaching above PT goals. Pts/ family goals   1. None stated    Patient and or family understand(s) diagnosis, prognosis, and plan of care. PLAN  PT care will be provided in accordance with the objectives noted above. Whenever appropriate, clear delegation orders will be provided for nursing staff.   Exercises and functional mobility practice will be used as well as appropriate

## 2018-08-12 NOTE — PROGRESS NOTES
small vessel ischemic disease. XR CHEST PORTABLE   Final Result   Left pleural effusion and likely associated atelectasis. Left lower   lung infiltrate and airspace consolidation cannot be excluded. Right basilar atelectasis. Possible small right pleural effusion. All imaging and labs reviewed today     Assessment:     Possible lacunar stroke suspected however not showing on CT and unable to have MRI. Left sided sensory weakness in face arm and leg started 2 weeks ago and has not been progressive. Patient is already on aspirin and warfarin. He is INR is 2.1, in therapeutic range on arrival.  CT scan of the head did not show any significant acute intracranial disease even thought s/s started 2-3 weeks ago. Patient cannot have MRI because he has a pacemaker. No need to repeat CT. Patient Active Problem List   Diagnosis    Chronic atrial fibrillation (HCC)    Mitral regurgitation    JEANNE (obstructive sleep apnea)    Chronic combined systolic and diastolic CHF (congestive heart failure) ([address]    CAD (coronary artery disease), native coronary artery    Hyperlipidemia LDL goal <100    Essential hypertension    Diabetes mellitus type 2, uncontrolled ([address]    ICD (implantable cardioverter-defibrillator) in place    CKD (chronic kidney disease) stage 3, GFR 30-59 ml/min    Acute CVA (cerebrovascular accident) ([address]    Acute on chronic congestive heart failure ([address]       Plan:     Echo completed, report pending  A1C 7.1, LDL 63  Continue warfarin and aspirin, INR today 2.5  Continue Lipitor   Better DM control  PT/OT    Ok to discharge from neuro standpoint once medically cleared if Echo is normal.  Neuro will sign off.       DIAZ Townsend, CNP  2:35 PM  8/12/2018

## 2018-08-12 NOTE — PROGRESS NOTES
diffuse, hazy opacification of the left lower lung and partial silhouetting of the left hemidiaphragm, suggestive of a left pleural effusion with associated atelectasis. Airspace consolidation in the left lower lung cannot be excluded. Hazy opacities in the right lung base. Small right pleural effusion cannot be excluded. Left pleural effusion and likely associated atelectasis. Left lower lung infiltrate and airspace consolidation cannot be excluded. Right basilar atelectasis. Possible small right pleural effusion. Us Carotid Artery Bilateral    Result Date: 2018  Patient MRN:  [id number] : 1940 Age: 70 years Gender: Male Order Date:  2018 7:45 PM TECHNIQUE/NUMBER OF IMAGES/COMPARISON/CLINICAL HISTORY: Carotid ultrasound. Grayscale/color Doppler real-time ultrasound 50 images Stroke. FINDINGS: Arthritic scar changes in mild to moderate degree are seen in both carotid arterial system with intimal thickening, calcified and noncalcified plaque formation but appears. No significant degree of anatomical stenosis. Cannot identified anatomic stenosis higher than 50%. Velocities: Right carotid arterial system velocities: No significant increase in peak systolic velocities seen in the right carotid arterial system. The ratio between right ICA/CCA is 1.4. Flow in the right vertebral artery is antegrade up to 46 cm/s. Left carotid arterial system: No significant increase in peak systolic velocity in the left carotid arterial system. Mild increase in peak submucosal seen in the left ECA up to 129cm/s. The ratio between the left ICA/CCA is 1.2. The flow in the left vertebral artery is antegrade up to 52 cm/s. Bilateral atherosclerotic changes in mild to moderate degree. No indication for hemodynamic stenosis in both sides.       Medications     dextrose        nystatin   Topical BID    warfarin (COUMADIN) daily dosing (placeholder)   Other RX Placeholder    amiodarone  100 mg Oral Daily    aspirin 81 mg Oral Nightly    atorvastatin  80 mg Oral Nightly    calcium elemental  2 tablet Oral Daily    carvedilol  6.25 mg Oral BID WC    glipiZIDE  5 mg Oral QAM AC    isosorbide mononitrate  30 mg Oral Daily    losartan  25 mg Oral Daily    magnesium gluconate  250 mg Oral BID    metFORMIN  500 mg Oral BID WC    pantoprazole  40 mg Oral QAM AC    spironolactone  12.5 mg Oral Daily    torsemide  40 mg Oral BID    sodium chloride flush  10 mL Intravenous 2 times per day    insulin lispro  0-6 Units Subcutaneous TID WC    insulin lispro  0-3 Units Subcutaneous Nightly    mineral oil-hydrophilic petrolatum   Topical BID      DIET CARB CONTROL;    ASSESSMENT/PLAN:   Acute Cerebrovascular accident )   Chronic combined systolic and diastolic Congestive heart failure    Chronic atrial fibrillation (HCC)   Diabetes mellitus type 2, uncontrolled (HCC)   Essential hypertension   Hyperlipidemia LDL goal <100 CKD (chronic kidney disease) stage 3, GFR 30-59 ml/min   CAD (coronary artery disease), native coronary artery   ICD (implantable cardioverter-defibrillator) in place   JEANNE (obstructive sleep apnea)   Mitral regurgitation   Cardio and neuro note reviewed  DVT  Prophylaxis on Warfarin  Having 2 de echo today    Electronically signed by Calvin Schmitz MD on 8/12/2018 at 10:47 AM

## 2018-08-13 PROBLEM — I25.5 ISCHEMIC CARDIOMYOPATHY: Chronic | Status: ACTIVE | Noted: 2018-01-01

## 2018-08-13 PROBLEM — I63.9 ACUTE CVA (CEREBROVASCULAR ACCIDENT) (HCC): Status: RESOLVED | Noted: 2018-01-01 | Resolved: 2018-01-01

## 2018-08-13 NOTE — PROGRESS NOTES
Stroke clinic apt sheet faxed to [email or web address] - pt is being discharged tonight- placed on avs to call pt with apt date

## 2018-08-13 NOTE — PROGRESS NOTES
bothering him. Had caroitid US, noted. Showed nothing new EF 20% bi-atrial enlargement, 3-4+ MR/TR. Called re: his low bp - this is chronic normal in the 56-82V systolic.-1/2 am meds were held, got 250 cc NS    Telemetry personally reviewed and showed afib, V pacing in the 70\"s    Review of Systems:   Cardiac: As per HPI  General: No fever, chills  Pulmonary: No cough, wheeze, or shortness of breath  GI: No nausea, vomiting,or abdominal pain  Neuro: No headache or confusion    Problem List:  Principal Problem:    Acute CVA (cerebrovascular accident) ([address]  Active Problems:    Chronic atrial fibrillation (HCC)    Mitral regurgitation    JEANNE (obstructive sleep apnea)    Chronic combined systolic and diastolic CHF (congestive heart failure) (HCC)    CAD (coronary artery disease), native coronary artery    Hyperlipidemia LDL goal <100    Essential hypertension    Diabetes mellitus type 2, uncontrolled ([address]    ICD (implantable cardioverter-defibrillator) in place    CKD (chronic kidney disease) stage 3, GFR 30-59 ml/min    Acute on chronic congestive heart failure (HCC)  Resolved Problems:    * No resolved hospital problems. *      Past Medical History:  Past Medical History:   Diagnosis Date    Ascending aortic aneurysm (HCC)     Atrial fibrillation (HCC)     CAD (coronary artery disease)     Chronic combined systolic and diastolic CHF (congestive heart failure) (HCC)     History of MI (myocardial infarction)     Hyperlipidemia     Hypertension     Kidney stones     Mitral regurgitation     Neuropathy     Non-insulin dependent type 2 diabetes mellitus (HCC)     JEANNE (obstructive sleep apnea)     Pacemaker     Pleural effusion     Systolic heart failure ([address] 10/14/2012    10/14/2012-echocardiogram revealed an LVEF of 35-40%       Allergies:   Allergies   Allergen Reactions    Ancef [Cefazolin Sodium]      Flushing     Bacitracin      Eye ointment-made eye red and irritated    Biaxin [Clarithromycin] % injection 10 mL  10 mL Intravenous 2 times per day Siobhan Duncan MD   10 mL at 08/13/18 0827    sodium chloride flush 0.9 % injection 10 mL  10 mL Intravenous PRN Siobhan Duncan MD        magnesium hydroxide (MILK OF MAGNESIA) 400 MG/5ML suspension 30 mL  30 mL Oral Daily PRN Siobhan Duncan MD   30 mL at 08/11/18 1007    glucose (GLUTOSE) 40 % oral gel 15 g  15 g Oral PRN Siobhan Duncan MD        dextrose 50 % solution 12.5 g  12.5 g Intravenous PRN Siobhan Duncan MD        glucagon (rDNA) injection 1 mg  1 mg Intramuscular PRN Siobhan Duncan MD        dextrose 5 % solution  100 mL/hr Intravenous PRN Siobhan Duncan MD        insulin lispro (HUMALOG) injection vial 0-6 Units  0-6 Units Subcutaneous TID WC Siobhan Duncan MD   1 Units at 08/12/18 0801    insulin lispro (HUMALOG) injection vial 0-3 Units  0-3 Units Subcutaneous Nightly Siobhan Duncan MD   1 Units at 08/12/18 2109    perflutren lipid microspheres (DEFINITY) injection 1.65 mg  1.5 mL Intravenous ONCE PRN Annamarie Li MD        mineral oil-hydrophilic petrolatum (HYDROPHOR) ointment   Topical BID Siobhan Duncan MD        mineral oil-hydrophilic petrolatum (HYDROPHOR) ointment   Topical PRN Siobhan Duncan MD          dextrose         Physical Exam:  BP 90/64 Comment: manual B/P  Pulse 76   Temp 97.6 °F (36.4 °C) (Temporal)   Resp 18   Ht 5' 9\" (1.753 m)   Wt 213 lb (96.6 kg)   SpO2 98%   BMI 31.45 kg/m²   Weight change: Wt Readings from Last 3 Encounters:   08/12/18 213 lb (96.6 kg)   07/07/18 224 lb 14.4 oz (102 kg)   04/15/18 257 lb (116.6 kg)     General: Awake, alert, oriented x3,chronically ill appearing -up in bed, no acute distress  Neck: No JVD, carotid bruits, thyromegaly, or lymphadenopathy  Cardiac:regular normal S1 and split S2, no murmurs, no S3 or S4, no pericardial rubs.   Carotid upstrokes brisk  Resp: clear bilaterally without wheezes, rhonchi, or rales; unlabored respirations  Abdomen: soft,

## 2018-08-13 NOTE — DISCHARGE SUMMARY
Physician Discharge Summary     Patient ID:  Chaim Chau  [id number]  77 y.o.  1940    Admit date: 8/10/2018    Discharge date and time:  Aug 13, 2018    Admission Diagnoses:   1) probable TIA vs lacunar infarct  2) chronic atrial fibrillation-currently paced  3) CAD native coronaries  4) mixed hyperlipidemia  5) ischemic cardiomyopathy with EF 20%  6) CKD stage III  7) chronic systolic CHF    Discharge Diagnoses: same    Consults: cardiology Henry Castellanos) and neurology (Poonam Gaspar)    Procedures: none    Hospital Course: the patient is a 70 y.o. white man followed by DR Gomez Guillen who presents secondary to left sided weakness and slurred speech. Initial CT imaging of the brain was negative for acute neurological ischemia. He was admitted and evaluated by neurology. He was unable to have MRI due to pacemaker being present. He has chronic afib, but INR was therapeutic. Lipid panel and BP were at goal.  Echo did not demonstrated any shunt/clot, but ischemic cardiomyopathy with EF 20% was noted (he has known heart failure). Cardiology was consulted to assist in management of cardiomyopathy. Symptoms of weakness/speech difficulty resolved during his stay. Once work up was complete and he was tolerating activity, he was discharged to home with home care.     Discharge Exam:  See progress note from today    Disposition: home    Patient Instructions:   Current Discharge Medication List      CONTINUE these medications which have CHANGED    Details   torsemide (DEMADEX) 20 MG tablet Take 2 tablets by mouth 2 times daily  Qty: 120 tablet, Refills: 0         CONTINUE these medications which have NOT CHANGED    Details   losartan (COZAAR) 25 MG tablet Take 25 mg by mouth daily      B Complex-Biotin-FA (BALANCED B COMPLEX) TABS Take 1 tablet by mouth daily      Coenzyme Q10 (CO Q 10) 10 MG CAPS Take 1 capsule by mouth daily      calcium carbonate 600 MG TABS tablet Take 2 tablets by mouth daily      isosorbide mononitrate (IMDUR) 30 MG extended release tablet Take 1 tablet by mouth daily  Qty: 30 tablet, Refills: 3      carvedilol (COREG) 6.25 MG tablet Take 1 tablet by mouth 2 times daily (with meals)  Qty: 60 tablet, Refills: 3      pantoprazole (PROTONIX) 40 MG tablet Take 1 tablet by mouth every morning (before breakfast)  Qty: 30 tablet, Refills: 3      clotrimazole-betamethasone (LOTRISONE) 1-0.05 % cream Apply topically 2 times daily. Qty: 1 Tube, Refills: 0      nitroGLYCERIN (NITROSTAT) 0.4 MG SL tablet Place 0.4 mg under the tongue every 5 minutes as needed for Chest pain up to max of 3 total doses. If no relief after 1 dose, call 911.      amiodarone (PACERONE) 100 MG tablet Take 100 mg by mouth daily      spironolactone (ALDACTONE) 25 MG tablet Take 12.5 mg by mouth daily      Multiple Vitamins-Minerals (MENS MULTIVITAMIN PLUS) TABS Take 1 tablet by mouth daily      glipiZIDE (GLUCOTROL XL) 5 MG extended release tablet Take 5 mg by mouth daily      warfarin (COUMADIN) 5 MG tablet Take 5 mg by mouth daily      metFORMIN (GLUCOPHAGE-XR) 500 MG extended release tablet Take 500 mg by mouth 2 times daily      magnesium (MAGNESIUM-OXIDE) 250 MG TABS tablet Take 1 tablet by mouth 2 times daily  Qty: 60 tablet, Refills: 0      acetaminophen (TYLENOL) 325 MG tablet Take 975 mg by mouth 3 times daily as needed for Pain or Fever       aspirin 81 MG chewable tablet Take 81 mg by mouth nightly   Qty: 30 tablet, Refills: 0      L-methylfolate-B6-B12 (METANX) 9-88.300-0-72 MG CAPS capsule Take 1 each by mouth 2 times daily. atorvastatin (LIPITOR) 80 MG tablet Take 1 tablet by mouth nightly. Qty: 30 tablet, Refills: 5      fish oil-omega-3 fatty acids 1000 MG capsule Take 1,000 mg by mouth 2 times daily       doxyLAMINE succinate (UNISOM) 25 MG tablet Take 25 mg by mouth nightly as needed for Sleep           Activity: activity as tolerated  Diet: cardiac diet and diabetic diet    Follow-up with DR Hong Ness in 3 days.     Note that over 30 minutes was spent in preparing discharge papers, discussing discharge with patient, medication review, etc.    Signed:  Latisha Olmedo MD  8/13/2018  7:39 PM

## 2018-08-13 NOTE — CARE COORDINATION
8/13/2018 social work transition of care/discharge planning  Sw was notified of patient discharge. Sw notified home care and setting up home o2. Orders and info faxed to [phone] [facility]. Will await response from [facility].   Electronically signed by OSCAR Diaz on 8/13/2018 at 3:20 PM

## 2018-08-13 NOTE — PROGRESS NOTES
Subjective: The patient is awake and alert. Sitting up in a chair. Left side back to normal strength. No speech difficulties. No headache. No chest pain or dyspnea. Objective:    BP 90/64 Comment: manual B/P  Pulse 76   Temp 97.6 °F (36.4 °C) (Temporal)   Resp 18   Ht 5' 9\" (1.753 m)   Wt 213 lb (96.6 kg)   SpO2 98%   BMI 31.45 kg/m²     Current medications that patient is taking have been reviewed. Heart:  RRR, no murmurs, gallops, or rubs.   Lungs:  CTA bilaterally, no wheeze, rales or rhonchi  Abd: bowel sounds present, soft, nontender, nondistended, no masses  Extrem:  No clubbing, cyanosis, or edema  Neuro:  CN intact, upper and lower extremity strength 5/5 bilaterally, normal speech    PT/INR:    Lab Results   Component Value Date    PROTIME 36.6 08/13/2018    INR 3.2 08/13/2018       Echo report reviewed      Assessment:    1) probable TIA vs lacunar infarct  2) chronic atrial fibrillation-currently paced, INR mildly supra therapeutic  3) CAD native coronaries  4) mixed hyperlipidemia  5) ischemic cardiomyopathy with EF 20%  6) CKD stage III  7) chronic systolic CHF    Plan:    1) continue current medications  2) remove leung catheter  3) ambulate  4) discharge home with home care if no further work up planned by cardiology    Discussed with family who was in the room at the time of visit (son)      Theodore Jimenez MD  1:27 PM  8/13/2018

## 2018-08-13 NOTE — CARE COORDINATION
8/13/2018 SOCIAL WORK TRANSITION OF CARE/DISCHARGE PLANNING  Sw followed up with [employer] about home o2. The rep stated that they have everything they needed and its set for today. They will deliver tank to the room,once they can get a .    Electronically signed by OSCAR Hernandez on 8/13/2018 at 4:06 PM

## 2018-08-13 NOTE — PROGRESS NOTES
Pharmacy Consultation Note  (Anticoagulant Dosing and Monitoring)    Initial consult date: 8-  Consulting physician: Dr. Sherrye Baumgarten: Ancef [cefazolin sodium]; Bacitracin; Biaxin [clarithromycin]; Zyvox [linezolid]; and Doxycycline    70 y.o. male; 175.3 cm, 101.8 kg  Warfarin Indication Target   INR [place] Dose  (if applicable) Diet/Feeding Tube   Chronic AF 2-3 5 mg daily 8/10: carb control     Warfarin drug-drug interactions  Start  Stop Home Med? Comments   Amiodarone 100 mg PO daily    Yes  Increase INR                  TSH:  2.150 (10/14/2012)   Hepatic Function Panel:                            Lab Results   Component Value Date    ALKPHOS 109 08/11/2018    ALT 34 08/11/2018    AST 27 08/11/2018    PROT 5.9 08/11/2018    BILITOT 1.1 08/11/2018    LABALBU 3.1 08/11/2018     Date Warfarin Dose INR Heparin or LMWH HBG/HCT PLT Comment   8/10 5 mg  2.3 X 13.6/41.5 212    8/11 5 mg 2.1 X 12.8/38.4 201    8/12 5 mg 2.5 X X X    8/13 No Dose 3.2 X X X               Assessment:  · 77 yo/M, sent to hospital on 8/10 from PCP office d/t slurred speech and dragging of left foot/leg; w/u for CVA. Symptoms have since resolved. · On warfarin PTA for chronic AF. Also on amiodarone 100 mg PO daily. · Warfarin dose PTA = 5 mg daily; INR = 2.3 on 8/10 admission. · 8/12: INR = 2.5, no labs ordered today. TTE today per PCP. · 8/13: INR = 3.2; may need warfarin dose reduction based on response to the 5 mg doses received. Plan:  · No warfarin tonight. Resume dosing when INR < 3.1  · Daily PT/INR until the INR is stable within the therapeutic range. · Pharmacist will follow and monitor/adjust dosing as necessary.     Deepthi Ellis, PharmD  8/13/2018  12:23 PM  Pager: [phone]

## 2018-09-06 NOTE — ED NOTES
[facility].  Reference # [id number]     Miguel Ng RN  09/06/18 1487

## 2021-12-31 NOTE — ED PROVIDER NOTES
Physical Therapy     Referred by: Mray Peace,*; Medical Diagnosis (from order):    Diagnosis Information      Diagnosis    724.5 (ICD-9-CM) - M54.9 (ICD-10-CM) - Upper back pain on right side    719.41, 338.29 (ICD-9-CM) - M25.511, G89.29 (ICD-10-CM) - Chronic right shoulder pain    728.87 (ICD-9-CM) - M62.81 (ICD-10-CM) - Muscle weakness of right upper extremity                Daily Treatment Note    Visit:  2     SUBJECTIVE                                                                                                               Patient is stating that he still cannot sleep at night and his symptoms are constant.  Patient stating that he is having most of his symptoms under his right shoulder blade (pointing to distal end).  Patient stating that the \"pins and needles\" sensation has diminished since his last visit.    OBJECTIVE                                                                                                                   Hand Dominance: right-handed;     Observation:   Cervical: forward head  Shoulder: rounded      TREATMENT                                                                                                                initial evaluation completed    Therapeutic Exercise:  Lower trunk rotation x 10   Upper back stretch 2 x 10 seconds   Doorway pectoral stretch x 30 seconds  NEW  Thread the needle stretch 5 x bilateral   Cat/cow stretch  5 x  \"look at your tail\" stretch 5 x bilateral       Manual Therapy:  Manual therapy to increase joint mobility, improve soft tissue mobility, decrease muscle guarding, decrease edema, improve range of motion:    soft tissue mobilization through the right upper quarter in left side lying     Neuromuscular Re-Education:  Neuromuscular Re-education to improve posture, improve proprioception, improve coordination and improve kinesthetic sense:    Sciatic nerve glides x 10 --defer to home  Median nerve glides x 10   Supine cane flexion x     Department of Emergency Medicine   ED  Provider Note  Admit Date/RoomTime: 9/6/2018 12:40 AM  ED Room: 04/04      History of Present Illness:  9/6/18, Time: 12:13 AM       Raheem Stafford is a 70 y.o. male presenting to the ED for respiratory distress, beginning today. The complaint has been persistent, severe in severity, and worsened by nothing. Pt arrived to the ED via EMS where the family called. EMS reports that pt was allegedly short of breath, incomprehendible and not acting himself 15 mins pta. Pt did go into cardiac arrest 5 mins pta. EMS gave him 1 epinephrine en route to the ED. CPR was continued upon arrival. Hx of CHF, A-fib, DM, HTN and MI. Unable to obtain a complete HPI due to this patient's due to condition. Review of Systems:   Unable to obtain a complete ROS due to the patients due to condition.     --------------------------------------------- PAST HISTORY ---------------------------------------------  Past Medical History:  has a past medical history of Ascending aortic aneurysm ([facility] [address] Atrial fibrillation ([facility] [address] CAD (coronary artery disease); Chronic combined systolic and diastolic CHF (congestive heart failure) ([facility] [address] History of MI (myocardial infarction); Hyperlipidemia; Hypertension; Kidney stones; Mitral regurgitation; Neuropathy; Non-insulin dependent type 2 diabetes mellitus ([facility] [address] JEANNE (obstructive sleep apnea); Pacemaker; Pleural effusion; and Systolic heart failure ([facility] [address] Past Surgical History:  has a past surgical history that includes Cardiac defibrillator placement (2007); Cardiac pacemaker placement (02/23/07); Coronary angioplasty with stent (09/02/03, 05/12/09); Coronary artery bypass graft (1986); Coronary artery bypass graft (2000); Inguinal hernia repair; Wrist surgery (2001); Lithotripsy; Colonoscopy; other surgical history (2011); other surgical history (11/2011); ECHO Compl W Dop Color Flow (10/14/2012); Coronary angioplasty with stent (11/2012);  Cardiac 10 with cues to have the left help the right   NEW  Half foam roll-deep breaths, nods, scapular protractions and depressions, 'W's, james and eda - 6 x each exercise      Home Exercise Program/Education Materials: Access Code: [email or web address]  URL: [email or web address]  Date: 12/29/2021  Prepared by: Mariela    Exercises  · Standing Median Nerve Glide - 1 x daily - 7 x weekly - 1 sets - 10 reps  · Supine Sciatic Nerve Glide - 1 x daily - 7 x weekly - 1 sets - 10 reps  · Doorway Pec Stretch at 60 Elevation - 1 x daily - 7 x weekly - 3 sets - 10 reps  · Supine Lower Trunk Rotation - 1 x daily - 7 x weekly - 1 sets - 10 reps  · Standing Lower Cervical and Upper Thoracic Stretch - 1 x daily - 7 x weekly - 1 sets - 10 reps  · Supine Shoulder Flexion Extension AAROM with Dowel - 1 x daily - 7 x weekly - 1 sets - 10 reps              ASSESSMENT                                                                                                             Patient reports decreased complaints of tightness after techniques to decrease muscle tone with manual techniques.  Patient  was able to perform exercises  with good form, but needing some cues for optimal alignment.  Patient  was also able to continue working on exerises given at first visit.  Patient Education:   Results of above outlined education: Verbalizes understanding and Demonstrates understanding      PLAN                                                                                                                           Suggestions for next session as indicated: Progress per plan of care         Therapy procedure time and total treatment time can be found documented on the Time Entry flowsheet   myself. Pulse 102   Oxygen Saturation Interpretation: None    The patients available past medical records and past encounters were reviewed. ------------------------------ ED COURSE/MEDICAL DECISION MAKING----------------------  Medications   0.9 % sodium chloride bolus (not administered)       Procedures:  PROCEDURE  9/6/18       Time: 11:50pm    INTUBATION  Risks, benefits and alternatives if able (for applicable procedures below) described. Performed By: Naheed Dumont MD    Indication:  Respiratory failure. Informed consent:  Unable to be obtained due to patient's condition. .  Procedure: Following Preoxygenation the patient was pretreated with nothing. Intubation was performed after single attempt(s) by direct laryngoscopy using a Glidescope and 7.5mm cuffed endotracheal tube was inserted . Initial post procedure placement:  confirmed by bilateral breath sounds, ETCO2 detection, and absence of sounds over stomach. Tube Secured @ 23 cm at the Lip. Post procedure chest x-ray: has been ordered but is still pending. Procedural Complications: None. Anesthesia Consult:  No.       PROCEDURE  9/6/18       Time: 12:05am    CENTRAL LINE INSERTION  Risks, benefits and alternatives (for applicable procedures below) described. Performed By: Naheed Dumont MD.    Indication: Respiratory distress. Informed consent: Unable to be obtained due to patient's condition. .  Procedure: After routine sterile preparation, a right 3-Lumen 7F Central Venous Catheter was placed by femoral vein approach and secured by standard fashion. Ultrasound Guidance:   not used. Number of Attempts: 1  Post-procedure Findings: A post procedural chest x-ray  was not indicated. Patient tolerated the procedure well. Medical Decision Making:    Pt presented originally for respiratory distress and went into cardiac arrest 5 mins pta. CPR was continued on arrival. Pt was intubated and a central line was inserted. Time of death called at 12:44am.     This patient's ED course included: multiple bedside re-evaluations, IV medications, cardiac monitoring, continuous pulse oximetry and a personal history and physical examination and re-evaluation prior to disposition. This patient has been closely monitored during their ED course. Re-Evaluations:           Re-evaluation. Patients symptoms show no change. While in  here in the emergency department. Patient had pulse that returned after intubation. But then lost pulse. CPR was continued. ACLS protocol was followed. Patient would go in and out of cardiac arrest. I spoke to family at length and he reported that he has an ejection fraction of only 18%. And that he would prefer not to have heroic measures performed on him. Family is brought to the room and patient was pronounced    Please note that the withdrawal or failure to initiate urgent interventions for this patient would likely result in a life threatening deterioration or permanent disability. Accordingly this patient received 30 minutes of critical care time, excluding separately billable procedures. Consultations:    Call placed to primary care physician    Counseling: The emergency provider has spoken with the patient and discussed todays results, in addition to providing specific details for the plan of care and counseling regarding the diagnosis and prognosis. Questions are answered at this time and they are agreeable with the plan.     --------------------------------- IMPRESSION AND DISPOSITION ---------------------------------    IMPRESSION  1.  Respiratory arrest ([facility] [address]    2. Cardiac arrest ([facility] [address]        DISPOSITION  Disposition: Other Disposition:   Patient condition is     18, 12:13 AM.    This note is prepared by Robin Norton, acting as Scribe for Jolie Toney MD.    Jolie Toney MD:  The scribe's documentation has been prepared under my direction and personally reviewed
